# Patient Record
Sex: FEMALE | Race: WHITE | NOT HISPANIC OR LATINO | Employment: FULL TIME | URBAN - METROPOLITAN AREA
[De-identification: names, ages, dates, MRNs, and addresses within clinical notes are randomized per-mention and may not be internally consistent; named-entity substitution may affect disease eponyms.]

---

## 2020-03-16 ENCOUNTER — OFFICE VISIT (OUTPATIENT)
Dept: INTERNAL MEDICINE CLINIC | Age: 30
End: 2020-03-16
Payer: COMMERCIAL

## 2020-03-16 VITALS
OXYGEN SATURATION: 98 % | WEIGHT: 165.6 LBS | TEMPERATURE: 100.1 F | BODY MASS INDEX: 28.27 KG/M2 | HEART RATE: 78 BPM | HEIGHT: 64 IN | SYSTOLIC BLOOD PRESSURE: 124 MMHG | DIASTOLIC BLOOD PRESSURE: 82 MMHG

## 2020-03-16 DIAGNOSIS — J06.9 URTI (ACUTE UPPER RESPIRATORY INFECTION): Primary | ICD-10-CM

## 2020-03-16 DIAGNOSIS — J02.8 PHARYNGITIS DUE TO OTHER ORGANISM: ICD-10-CM

## 2020-03-16 DIAGNOSIS — E66.3 OVERWEIGHT (BMI 25.0-29.9): ICD-10-CM

## 2020-03-16 DIAGNOSIS — J01.80 ACUTE NON-RECURRENT SINUSITIS OF OTHER SINUS: ICD-10-CM

## 2020-03-16 PROCEDURE — 99203 OFFICE O/P NEW LOW 30 MIN: CPT | Performed by: INTERNAL MEDICINE

## 2020-03-16 PROCEDURE — 3008F BODY MASS INDEX DOCD: CPT | Performed by: INTERNAL MEDICINE

## 2020-03-16 PROCEDURE — 1036F TOBACCO NON-USER: CPT | Performed by: INTERNAL MEDICINE

## 2020-03-16 RX ORDER — AMOXICILLIN AND CLAVULANATE POTASSIUM 875; 125 MG/1; MG/1
1 TABLET, FILM COATED ORAL EVERY 12 HOURS SCHEDULED
Qty: 14 TABLET | Refills: 0 | Status: SHIPPED | OUTPATIENT
Start: 2020-03-16 | End: 2020-03-23

## 2020-03-16 RX ORDER — FLUTICASONE PROPIONATE 50 MCG
1 SPRAY, SUSPENSION (ML) NASAL DAILY
Qty: 1 BOTTLE | Refills: 0 | Status: SHIPPED | OUTPATIENT
Start: 2020-03-16 | End: 2020-07-31 | Stop reason: ALTCHOICE

## 2020-03-16 RX ORDER — GUAIFENESIN 600 MG
600 TABLET, EXTENDED RELEASE 12 HR ORAL EVERY 12 HOURS SCHEDULED
Qty: 20 TABLET | Refills: 0 | Status: SHIPPED | OUTPATIENT
Start: 2020-03-16 | End: 2020-07-31 | Stop reason: ALTCHOICE

## 2020-03-16 RX ORDER — NORETHINDRONE ACETATE AND ETHINYL ESTRADIOL 1MG-20(21)
1 KIT ORAL DAILY
COMMUNITY
Start: 2020-02-18 | End: 2021-10-06

## 2020-03-16 NOTE — LETTER
March 16, 2020     Patient: Frankey Laming   YOB: 1990   Date of Visit: 3/16/2020       To Whom it May Concern:    Milton Gupta is under my professional care  She was seen in my office on 3/16/2020  She may return to work on 03/18/2020  If you have any questions or concerns, please don't hesitate to call           Sincerely,          Ivory Smith DO        CC: No Recipients

## 2020-03-16 NOTE — PATIENT INSTRUCTIONS
- Drink plenty of fluids  - Get plenty of rest  - You may use salt water gargles for your sore throat  - You may use over the counter tylenol or Ibuprofen (motrin or Advil) for any headache, muscle aches and fever  -  Please take antibiotics with food and eat yogurt daily as long as you are on antibiotics  - Call the office if your symptoms persist beyond a total of 7-10 days

## 2020-03-16 NOTE — PROGRESS NOTES
Assessment/Plan:    Upper respiratory tract infection with pharyngitis and sinusitis  - likely viral with bacterial superinfection vs bacterial  - we will start pt on Augmentin 875-125 mg twice daily for 7 days, Flonase nasal spray for rhinitis and Mucinex for productive cough  - patient was counseled to take her antibiotics with meals and also to use a probiotic or to eat yogurt daily as long as she is on antibiotics  - Pt was counseled to use OTC tylenol or ibuprofen with meals for aches and pains, warm salt water gargles for sore throat and was encouraged to drink lots of fluids, get plenty of rest and wash her hands liberally  - she should return to the clinic if her symptoms worsen or do not improve  Overweight  - diet and exercise counseling given    BMI Counseling: Body mass index is 28 55 kg/m²  The BMI is above normal  Nutrition recommendations include encouraging healthy choices of fruits and vegetables, consuming healthier snacks, increasing intake of lean protein and reducing intake of saturated and trans fat  Exercise recommendations include moderate physical activity 150 minutes/week  No pharmacotherapy was ordered  Patient referred to PCP due to patient being overweight  Diagnoses and all orders for this visit:    URTI (acute upper respiratory infection)  -     amoxicillin-clavulanate (AUGMENTIN) 875-125 mg per tablet; Take 1 tablet by mouth every 12 (twelve) hours for 7 days  -     fluticasone (FLONASE) 50 mcg/act nasal spray; 1 spray into each nostril daily  -     guaiFENesin (MUCINEX) 600 mg 12 hr tablet; Take 1 tablet (600 mg total) by mouth every 12 (twelve) hours    Pharyngitis due to other organism  -     amoxicillin-clavulanate (AUGMENTIN) 875-125 mg per tablet; Take 1 tablet by mouth every 12 (twelve) hours for 7 days  -     guaiFENesin (MUCINEX) 600 mg 12 hr tablet;  Take 1 tablet (600 mg total) by mouth every 12 (twelve) hours    Acute non-recurrent sinusitis of other sinus  -     amoxicillin-clavulanate (AUGMENTIN) 875-125 mg per tablet; Take 1 tablet by mouth every 12 (twelve) hours for 7 days  -     fluticasone (FLONASE) 50 mcg/act nasal spray; 1 spray into each nostril daily    Overweight (BMI 25 0-29  9)    Other orders  -     BLISOVI FE 1/20 1-20 MG-MCG per tablet          Subjective:      Patient ID: Nazario Gamble is a 35 y o  female  HPI  Patient presents to re-establish care with complaints of cough that is occasionally dry and occasionally productive but she states that she does not know the color of the phlegm because she does not look at it  She also complains of nasal congestion, sinus pain and pressure, rhinorrhea, with yellowish discharge, sore throat, abdominal pain and headaches that started 3 days ago  She denies fever, chills, night sweats, chest pain, shortness of breath, palpitations, nausea, diarrhea, constipation  She took the flu shot this season and denies a history of contact but 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  She denies any history of travel or contact with anyone who traveled outside the country  She does not smoke  The following portions of the patient's history were reviewed and updated as appropriate: She  has no past medical history on file  She   Patient Active Problem List    Diagnosis Date Noted    URTI (acute upper respiratory infection) 03/16/2020    Other acute sinusitis 03/16/2020    Overweight (BMI 25 0-29 9) 03/16/2020     She  has a past surgical history that includes [place] tooth extraction (2013)  Her family history includes Gout in her father  She  reports that she has never smoked  She has never used smokeless tobacco  She reports that she drinks alcohol  She reports that she does not use drugs    Current Outpatient Medications   Medication Sig Dispense Refill    BLISOVI FE 1/20 1-20 MG-MCG per tablet       amoxicillin-clavulanate (AUGMENTIN) 875-125 mg per tablet Take 1 tablet by mouth every 12 (twelve) hours for 7 days 14 tablet 0    fluticasone (FLONASE) 50 mcg/act nasal spray 1 spray into each nostril daily 1 Bottle 0    guaiFENesin (MUCINEX) 600 mg 12 hr tablet Take 1 tablet (600 mg total) by mouth every 12 (twelve) hours 20 tablet 0     No current facility-administered medications for this visit  Current Outpatient Medications on File Prior to Visit   Medication Sig    BLISOVI FE 1/20 1-20 MG-MCG per tablet      No current facility-administered medications on file prior to visit  She has No Known Allergies       Review of Systems   Constitutional: Negative for activity change, chills, fatigue, fever and unexpected weight change  HENT: Positive for congestion, rhinorrhea, sinus pressure, sinus pain and sore throat  Negative for ear pain and postnasal drip  Eyes: Negative for pain  Respiratory: Positive for cough (dry cough, sometimes with phlegm)  Negative for choking, chest tightness, shortness of breath and wheezing  Cardiovascular: Negative for chest pain, palpitations and leg swelling  Gastrointestinal: Positive for abdominal pain  Negative for constipation, diarrhea, nausea and vomiting  Genitourinary: Negative for dysuria and hematuria  Musculoskeletal: Negative for arthralgias, back pain, gait problem, joint swelling, myalgias and neck stiffness  Skin: Negative for pallor and rash  Neurological: Positive for headaches (with a  feeling of pressure in the head)  Negative for dizziness, tremors, seizures, syncope and light-headedness  Hematological: Negative for adenopathy  Psychiatric/Behavioral: Negative for behavioral problems  Objective:      /82 (BP Location: Left arm, Patient Position: Sitting, Cuff Size: Adult)   Pulse 78   Temp 100 1 °F (37 8 °C) (Tympanic)   Ht 5' 3 86" (1 622 m)   Wt 75 1 kg (165 lb 9 6 oz)   SpO2 98%   BMI 28 55 kg/m²          Physical Exam   Constitutional: She is oriented to person, place, and time   She appears well-developed and well-nourished  No distress  HENT:   Head: Normocephalic and atraumatic  Right Ear: Tympanic membrane is injected and erythematous  Left Ear: A foreign body (cerumen in ext aud canal) is present  Tympanic membrane is injected and erythematous  Nose: Mucosal edema and rhinorrhea present  Right sinus exhibits maxillary sinus tenderness and frontal sinus tenderness  Mouth/Throat: Posterior oropharyngeal edema and posterior oropharyngeal erythema present  No oropharyngeal exudate  Eyes: Pupils are equal, round, and reactive to light  Conjunctivae and EOM are normal  Right eye exhibits no discharge  Left eye exhibits no discharge  No scleral icterus  Neck: Normal range of motion  Neck supple  No JVD present  No tracheal deviation present  No thyromegaly present  Cardiovascular: Normal rate, regular rhythm, normal heart sounds and intact distal pulses  Exam reveals no gallop and no friction rub  No murmur heard  Pulmonary/Chest: Effort normal and breath sounds normal  No respiratory distress  She has no wheezes  She has no rales  She exhibits no tenderness  Abdominal: Soft  Bowel sounds are normal  She exhibits no distension and no mass  There is tenderness in the epigastric area  There is no rebound and no guarding  Musculoskeletal: Normal range of motion  She exhibits no edema, tenderness or deformity  Lymphadenopathy:        Head (right side): Submandibular adenopathy present  Head (left side): Submandibular (tender) adenopathy present  She has no cervical adenopathy  Neurological: She is alert and oriented to person, place, and time  She has normal reflexes  No cranial nerve deficit  She exhibits normal muscle tone  Coordination normal    Skin: Skin is warm and dry  No rash noted  She is not diaphoretic  No erythema  No pallor  Psychiatric: She has a normal mood and affect  Her behavior is normal          No visits with results within 12 Month(s) from this visit     Latest known visit with results is:   No results found for any previous visit

## 2020-04-06 DIAGNOSIS — J01.80 ACUTE NON-RECURRENT SINUSITIS OF OTHER SINUS: ICD-10-CM

## 2020-04-06 DIAGNOSIS — J06.9 URTI (ACUTE UPPER RESPIRATORY INFECTION): ICD-10-CM

## 2020-04-06 RX ORDER — FLUTICASONE PROPIONATE 50 MCG
SPRAY, SUSPENSION (ML) NASAL
Qty: 16 ML | Refills: 0 | OUTPATIENT
Start: 2020-04-06

## 2020-07-31 ENCOUNTER — TELEPHONE (OUTPATIENT)
Dept: ADMINISTRATIVE | Facility: OTHER | Age: 30
End: 2020-07-31

## 2020-07-31 ENCOUNTER — OFFICE VISIT (OUTPATIENT)
Dept: INTERNAL MEDICINE CLINIC | Age: 30
End: 2020-07-31
Payer: COMMERCIAL

## 2020-07-31 VITALS
DIASTOLIC BLOOD PRESSURE: 76 MMHG | WEIGHT: 168.4 LBS | HEART RATE: 84 BPM | HEIGHT: 64 IN | SYSTOLIC BLOOD PRESSURE: 120 MMHG | TEMPERATURE: 99.5 F | OXYGEN SATURATION: 98 % | BODY MASS INDEX: 28.75 KG/M2

## 2020-07-31 DIAGNOSIS — E66.3 OVERWEIGHT: ICD-10-CM

## 2020-07-31 DIAGNOSIS — R10.13 DYSPEPSIA: ICD-10-CM

## 2020-07-31 DIAGNOSIS — L72.3 SEBACEOUS CYST: ICD-10-CM

## 2020-07-31 DIAGNOSIS — Z00.00 ANNUAL PHYSICAL EXAM: Primary | ICD-10-CM

## 2020-07-31 DIAGNOSIS — K21.9 GASTROESOPHAGEAL REFLUX DISEASE WITHOUT ESOPHAGITIS: ICD-10-CM

## 2020-07-31 PROCEDURE — 99213 OFFICE O/P EST LOW 20 MIN: CPT | Performed by: INTERNAL MEDICINE

## 2020-07-31 PROCEDURE — 1036F TOBACCO NON-USER: CPT | Performed by: INTERNAL MEDICINE

## 2020-07-31 PROCEDURE — 99395 PREV VISIT EST AGE 18-39: CPT | Performed by: INTERNAL MEDICINE

## 2020-07-31 PROCEDURE — 3008F BODY MASS INDEX DOCD: CPT | Performed by: INTERNAL MEDICINE

## 2020-07-31 RX ORDER — FAMOTIDINE 20 MG/1
20 TABLET, FILM COATED ORAL 2 TIMES DAILY
Qty: 60 TABLET | Refills: 0 | Status: SHIPPED | OUTPATIENT
Start: 2020-07-31 | End: 2021-10-06

## 2020-07-31 NOTE — TELEPHONE ENCOUNTER
Upon review of the In Basket request we were able to locate, review, and update the patient chart as requested for Pap Smear (HPV) aka Cervical Cancer Screening  Any additional questions or concerns should be emailed to the Practice Liaisons via [email or web address] email, please do not reply via In Basket      Thank you  Neeru Cortes MA

## 2020-07-31 NOTE — TELEPHONE ENCOUNTER
----- Message from Herman Cushing, MA sent at 7/31/2020 11:42 AM EDT -----  Regarding: Pap  Contact: [phone]  07/31/20 11:42 AM    Hello, our patient Nicol Dunham has had Pap Smear (HPV) aka Cervical Cancer Screening completed/performed  Please assist in updating the patient chart by pulling the Care Everywhere (CE) document  The date of service is 01/20/2020       Thank you,  Herman Cushing, MA  [facility]

## 2020-07-31 NOTE — PROGRESS NOTES
Assessment/Plan:    Annual physical exam  - History and physical examination done  - Pt was counseled to eat a heart healthy diet, to drink at least 2 L of water daily, to take a daily multivitamin and to exercise for at least 30 minutes of cardio exercise daily, for at least 5 days a week  - CBC, CMP, TSH and lipid panel have been ordered and we will follow up with the results  - she is up-to-date with her tetanus vaccine  -she is up-to-date with her Pap smear  - follow up in 1 month        Diagnoses and all orders for this visit:    Annual physical exam  -     CBC and differential; Future  -     Comprehensive metabolic panel; Future  -     TSH, 3rd generation with Free T4 reflex; Future  -     Lipid panel; Future    Overweight    Dyspepsia  -     H  pylori antigen, stool; Future  -     famotidine (PEPCID) 20 mg tablet; Take 1 tablet (20 mg total) by mouth 2 (two) times a day    Gastroesophageal reflux disease without esophagitis  -     H  pylori antigen, stool; Future  -     famotidine (PEPCID) 20 mg tablet; Take 1 tablet (20 mg total) by mouth 2 (two) times a day          Subjective:      Patient ID: Jodie Muñoz is a 35 y o  female      HPI  Patient presents for an annual physical exam   Last annual physical exam- years ago    Past medical history-  Dyspepsia, seasonal asthma, a lot of bronchitis and pna as a child  Past surgical history- none    Medications- OCP,   Allergies- NKDA    Diet- a mixture of balanced and junk, mostly junk, drinks at least two glasses of water daily  Exercise- not much    Alcohol use- five glasses of wine a week, usually one a day  Caffeine and soda use- about one cup of coffee a day, no soda  Nicotine use- never  Recreational drug use- never     Work- payroll  Sexual history, STD history and HIV testing- monogamous with boyfriend, std hx - never, HIV testing - never    Gynecological history/Prostate health/testicular health history- LMP - two weeks ago, last pap smear - [facility] 2020,  Colonoscopy- N/A  Immunization history- last tetanus shot - 2013    Dental visit- every year  Family history- htn - ? Arlon Doles( dad), ? Dad  Lung ca - grandma( mom) , grandma( dad) - skin    Today, patient admits to abdominal pain in the epigastric region with excessive bloating, excessive gas and heartburn  She states that the symptoms wake her up at night sometimes  She admits that sometimes uses NSAIDs without food  She denies any travel to a 3rd world country  She denies fever, chills, night sweats, chest pain, shortness of breath, palpitations, nausea, vomiting, diarrhea, constipation, dark tarry stools, hematochezia, hematuria, myalgias, arthralgias  She also complains of a hard lump on the lateral aspect of her left lower leg that has been present for about 2 years  She states that it has not changed in size and is not tender or itchy  The following portions of the patient's history were reviewed and updated as appropriate:   She  has no past medical history on file  She   Patient Active Problem List    Diagnosis Date Noted    Annual physical exam 07/31/2020    Dyspepsia 07/31/2020    Gastroesophageal reflux disease without esophagitis 07/31/2020    URTI (acute upper respiratory infection) 03/16/2020    Other acute sinusitis 03/16/2020    Overweight 03/16/2020     She  has a past surgical history that includes [place] tooth extraction (2013)  Her family history includes Gout in her father  She  reports that she has never smoked  She has never used smokeless tobacco  She reports that she drinks alcohol  She reports that she does not use drugs  Current Outpatient Medications   Medication Sig Dispense Refill    BLISOVI  1/20 1-20 MG-MCG per tablet Take 1 tablet by mouth daily       famotidine (PEPCID) 20 mg tablet Take 1 tablet (20 mg total) by mouth 2 (two) times a day 60 tablet 0     No current facility-administered medications for this visit        Current Outpatient Medications on File Prior to Visit   Medication Sig    BLISOVI FE 1/20 1-20 MG-MCG per tablet Take 1 tablet by mouth daily     [DISCONTINUED] fluticasone (FLONASE) 50 mcg/act nasal spray 1 spray into each nostril daily (Patient not taking: Reported on 7/31/2020)    [DISCONTINUED] guaiFENesin (MUCINEX) 600 mg 12 hr tablet Take 1 tablet (600 mg total) by mouth every 12 (twelve) hours (Patient not taking: Reported on 7/31/2020)     No current facility-administered medications on file prior to visit  She has No Known Allergies       Review of Systems   Constitutional: Negative for activity change, chills, fatigue, fever and unexpected weight change  HENT: Negative for ear pain, postnasal drip, rhinorrhea, sinus pressure and sore throat  Eyes: Negative for pain  Respiratory: Negative for cough, choking, chest tightness, shortness of breath and wheezing  Cardiovascular: Negative for chest pain, palpitations and leg swelling  Gastrointestinal: Positive for abdominal pain (epigastic with bloating and excessive gas and heart burn)  Negative for constipation, diarrhea, nausea and vomiting  Genitourinary: Negative for dysuria and hematuria  Musculoskeletal: Negative for arthralgias, back pain, gait problem, joint swelling, myalgias and neck stiffness  Skin: Negative for pallor and rash  A skin bump for 2 years on her left leg, laterally   Neurological: Negative for dizziness, tremors, seizures, syncope, light-headedness and headaches  A sensation of Poor focus   Hematological: Negative for adenopathy  Psychiatric/Behavioral: Negative for behavioral problems  Objective:      /76 (BP Location: Left arm, Patient Position: Sitting, Cuff Size: Standard)   Pulse 84   Temp 99 5 °F (37 5 °C) (Temporal)   Ht 5' 3 86" (1 622 m)   Wt 76 4 kg (168 lb 6 4 oz)   SpO2 98%   BMI 29 03 kg/m²          Physical Exam   Constitutional: She is oriented to person, place, and time   She appears well-developed and well-nourished  No distress  HENT:   Head: Normocephalic and atraumatic  Right Ear: External ear normal    Left Ear: External ear normal    Nose: Nose normal    Mouth/Throat: Oropharynx is clear and moist  No oropharyngeal exudate  Eyes: Pupils are equal, round, and reactive to light  Conjunctivae and EOM are normal  Right eye exhibits no discharge  Left eye exhibits no discharge  No scleral icterus  Neck: Normal range of motion  Neck supple  No JVD present  No tracheal deviation present  No thyromegaly present  Cardiovascular: Normal rate, regular rhythm, normal heart sounds and intact distal pulses  Exam reveals no gallop and no friction rub  No murmur heard  Pulmonary/Chest: Effort normal and breath sounds normal  No respiratory distress  She has no wheezes  She has no rales  She exhibits no tenderness  Abdominal: Soft  Bowel sounds are normal  She exhibits no distension and no mass  There is no tenderness  There is no rebound and no guarding  Musculoskeletal: Normal range of motion  She exhibits no edema, tenderness or deformity  Left lower leg: She exhibits swelling (Sebaceous cyst measuring about 1 cm in lateral aspect of left lower leg)  Legs:  Lymphadenopathy:     She has no cervical adenopathy  Neurological: She is alert and oriented to person, place, and time  She has normal reflexes  No cranial nerve deficit  She exhibits normal muscle tone  Coordination normal    Cranial nerves 2-12 are intact bilaterally  Muscle strength is 5/5 in all extremities  Sensation is intact in bilateral face and extremities  Rapid alternating movement and finger-to-nose pointing test are intact  Deep tendon reflexes are 2+ bilaterally  Gait is intact   Skin: Skin is warm and dry  No rash noted  She is not diaphoretic  No erythema  No pallor  Psychiatric: She has a normal mood and affect   Her behavior is normal          No visits with results within 12 Month(s) from this visit  Latest known visit with results is:   No results found for any previous visit

## 2020-07-31 NOTE — PROGRESS NOTES
Assessment/Plan:      Dyspepsia with GERD  -will order an H pylori antigen in stool  -will start patient on famotidine 20 mg twice daily  -she was counseled to avoid behaviors and diets that trigger some    Sebaceous cyst of left lower leg   -asymptomatic  -will continue to monitor it clinically    Overweight  -diet and exercise counseling given         Diagnoses and all orders for this visit:    Annual physical exam  -     CBC and differential; Future  -     Comprehensive metabolic panel; Future  -     TSH, 3rd generation with Free T4 reflex; Future  -     Lipid panel; Future    Overweight    Dyspepsia  -     H  pylori antigen, stool; Future  -     famotidine (PEPCID) 20 mg tablet; Take 1 tablet (20 mg total) by mouth 2 (two) times a day    Gastroesophageal reflux disease without esophagitis  -     H  pylori antigen, stool; Future  -     famotidine (PEPCID) 20 mg tablet; Take 1 tablet (20 mg total) by mouth 2 (two) times a day    Sebaceous cyst of left lower leg          Subjective:      Patient ID: Gabriel Esparza is a 35 y o  female  HPI  Patient presents for an annual physical and also has complaints  Today, patient admits to abdominal pain in the epigastric region with excessive bloating, excessive gas and heartburn  She states that the symptoms wake her up at night sometimes  She admits that sometimes uses NSAIDs without food  She denies any travel to a 3rd world country  She denies fever, chills, night sweats, chest pain, shortness of breath, palpitations, nausea, vomiting, diarrhea, constipation, dark tarry stools, hematochezia, hematuria, myalgias, arthralgias  She also complains of a hard lump on the lateral aspect of her left lower leg that has been present for about 2 years  She states that it has not changed in size and is not tender or itchy  The following portions of the patient's history were reviewed and updated as appropriate:   She  has no past medical history on file    She Patient Active Problem List    Diagnosis Date Noted    Annual physical exam 07/31/2020    Dyspepsia 07/31/2020    Gastroesophageal reflux disease without esophagitis 07/31/2020    Sebaceous cyst of left lower leg 07/31/2020    URTI (acute upper respiratory infection) 03/16/2020    Other acute sinusitis 03/16/2020    Overweight 03/16/2020     She  has a past surgical history that includes [place] tooth extraction (2013)  Her family history includes Gout in her father  She  reports that she has never smoked  She has never used smokeless tobacco  She reports that she drinks alcohol  She reports that she does not use drugs  Current Outpatient Medications   Medication Sig Dispense Refill    BLISOVI FE 1/20 1-20 MG-MCG per tablet Take 1 tablet by mouth daily       famotidine (PEPCID) 20 mg tablet Take 1 tablet (20 mg total) by mouth 2 (two) times a day 60 tablet 0     No current facility-administered medications for this visit  Current Outpatient Medications on File Prior to Visit   Medication Sig    BLISOVI FE 1/20 1-20 MG-MCG per tablet Take 1 tablet by mouth daily     [DISCONTINUED] fluticasone (FLONASE) 50 mcg/act nasal spray 1 spray into each nostril daily (Patient not taking: Reported on 7/31/2020)    [DISCONTINUED] guaiFENesin (MUCINEX) 600 mg 12 hr tablet Take 1 tablet (600 mg total) by mouth every 12 (twelve) hours (Patient not taking: Reported on 7/31/2020)     No current facility-administered medications on file prior to visit  She has No Known Allergies       Review of Systems   Constitutional: Negative for activity change, chills, fatigue, fever and unexpected weight change  HENT: Negative for ear pain, postnasal drip, rhinorrhea, sinus pressure and sore throat  Eyes: Negative for pain  Respiratory: Negative for cough, choking, chest tightness, shortness of breath and wheezing  Cardiovascular: Negative for chest pain, palpitations and leg swelling     Gastrointestinal: Positive for abdominal distention and abdominal pain (Epigastric abdominal pain with excessive gas and bloating)  Negative for constipation, diarrhea, nausea and vomiting  Genitourinary: Negative for dysuria and hematuria  Musculoskeletal: Negative for arthralgias, back pain, gait problem, joint swelling, myalgias and neck stiffness  Skin: Negative for pallor and rash  Lesion on left lower leg   Neurological: Negative for dizziness, tremors, seizures, syncope, light-headedness and headaches  Hematological: Negative for adenopathy  Psychiatric/Behavioral: Negative for behavioral problems  Objective:      /76 (BP Location: Left arm, Patient Position: Sitting, Cuff Size: Standard)   Pulse 84   Temp 99 5 °F (37 5 °C) (Temporal)   Ht 5' 3 86" (1 622 m)   Wt 76 4 kg (168 lb 6 4 oz)   SpO2 98%   BMI 29 03 kg/m²          Physical Exam   Constitutional: She is oriented to person, place, and time  She appears well-developed and well-nourished  No distress  HENT:   Head: Normocephalic and atraumatic  Right Ear: External ear normal    Left Ear: External ear normal    Nose: Nose normal    Mouth/Throat: Oropharynx is clear and moist  Mucous membranes are dry (Dry mucous membranes)  No oropharyngeal exudate  Eyes: Pupils are equal, round, and reactive to light  Conjunctivae and EOM are normal  Right eye exhibits no discharge  Left eye exhibits no discharge  No scleral icterus  Neck: Normal range of motion  Neck supple  No JVD present  No tracheal deviation present  No thyromegaly present  Cardiovascular: Normal rate, regular rhythm, normal heart sounds and intact distal pulses  Exam reveals no gallop and no friction rub  No murmur heard  Pulmonary/Chest: Effort normal and breath sounds normal  No respiratory distress  She has no wheezes  She has no rales  She exhibits no tenderness  Abdominal: Soft  Bowel sounds are normal  She exhibits no distension and no mass   There is tenderness ( epigastric abdominal tenderness) in the epigastric area  There is no rebound and no guarding  Musculoskeletal: Normal range of motion  She exhibits no edema, tenderness or deformity  Left lower leg: She exhibits swelling (Hard lump that measures about 1 cm on the lateral aspect of the left lower leg)  Legs:  Lymphadenopathy:     She has no cervical adenopathy  Neurological: She is alert and oriented to person, place, and time  She has normal reflexes  No cranial nerve deficit  She exhibits normal muscle tone  Coordination normal    Skin: Skin is warm and dry  No rash noted  She is not diaphoretic  No erythema  No pallor  Psychiatric: She has a normal mood and affect  Her behavior is normal          No visits with results within 12 Month(s) from this visit  Latest known visit with results is:   No results found for any previous visit

## 2020-07-31 NOTE — PATIENT INSTRUCTIONS

## 2020-08-08 LAB
ALBUMIN SERPL-MCNC: 4.2 G/DL (ref 3.6–5.1)
ALBUMIN/GLOB SERPL: 1.8 (CALC) (ref 1–2.5)
ALP SERPL-CCNC: 56 U/L (ref 31–125)
ALT SERPL-CCNC: 23 U/L (ref 6–29)
AST SERPL-CCNC: 18 U/L (ref 10–30)
BASOPHILS # BLD AUTO: 52 CELLS/UL (ref 0–200)
BASOPHILS NFR BLD AUTO: 0.7 %
BILIRUB SERPL-MCNC: 0.3 MG/DL (ref 0.2–1.2)
BUN SERPL-MCNC: 12 MG/DL (ref 7–25)
BUN/CREAT SERPL: NORMAL (CALC) (ref 6–22)
CALCIUM SERPL-MCNC: 9 MG/DL (ref 8.6–10.2)
CHLORIDE SERPL-SCNC: 108 MMOL/L (ref 98–110)
CHOLEST SERPL-MCNC: 148 MG/DL
CHOLEST/HDLC SERPL: 2.9 (CALC)
CO2 SERPL-SCNC: 24 MMOL/L (ref 20–32)
CREAT SERPL-MCNC: 0.88 MG/DL (ref 0.5–1.1)
EOSINOPHIL # BLD AUTO: 111 CELLS/UL (ref 15–500)
EOSINOPHIL NFR BLD AUTO: 1.5 %
ERYTHROCYTE [DISTWIDTH] IN BLOOD BY AUTOMATED COUNT: 12 % (ref 11–15)
GLOBULIN SER CALC-MCNC: 2.4 G/DL (CALC) (ref 1.9–3.7)
GLUCOSE SERPL-MCNC: 83 MG/DL (ref 65–99)
HCT VFR BLD AUTO: 42.3 % (ref 35–45)
HDLC SERPL-MCNC: 51 MG/DL
HGB BLD-MCNC: 14 G/DL (ref 11.7–15.5)
LDLC SERPL CALC-MCNC: 79 MG/DL (CALC)
LYMPHOCYTES # BLD AUTO: 3145 CELLS/UL (ref 850–3900)
LYMPHOCYTES NFR BLD AUTO: 42.5 %
MCH RBC QN AUTO: 28.4 PG (ref 27–33)
MCHC RBC AUTO-ENTMCNC: 33.1 G/DL (ref 32–36)
MCV RBC AUTO: 85.8 FL (ref 80–100)
MONOCYTES # BLD AUTO: 518 CELLS/UL (ref 200–950)
MONOCYTES NFR BLD AUTO: 7 %
NEUTROPHILS # BLD AUTO: 3574 CELLS/UL (ref 1500–7800)
NEUTROPHILS NFR BLD AUTO: 48.3 %
NONHDLC SERPL-MCNC: 97 MG/DL (CALC)
PLATELET # BLD AUTO: 286 THOUSAND/UL (ref 140–400)
PMV BLD REES-ECKER: 9.9 FL (ref 7.5–12.5)
POTASSIUM SERPL-SCNC: 4 MMOL/L (ref 3.5–5.3)
PROT SERPL-MCNC: 6.6 G/DL (ref 6.1–8.1)
RBC # BLD AUTO: 4.93 MILLION/UL (ref 3.8–5.1)
SL AMB EGFR AFRICAN AMERICAN: 103 ML/MIN/1.73M2
SL AMB EGFR NON AFRICAN AMERICAN: 89 ML/MIN/1.73M2
SODIUM SERPL-SCNC: 141 MMOL/L (ref 135–146)
TRIGL SERPL-MCNC: 94 MG/DL
TSH SERPL-ACNC: 4.07 MIU/L
WBC # BLD AUTO: 7.4 THOUSAND/UL (ref 3.8–10.8)

## 2020-08-10 ENCOUNTER — TELEPHONE (OUTPATIENT)
Dept: OTHER | Facility: HOSPITAL | Age: 30
End: 2020-08-10

## 2020-08-25 ENCOUNTER — TELEPHONE (OUTPATIENT)
Dept: INTERNAL MEDICINE CLINIC | Age: 30
End: 2020-08-25

## 2020-09-09 ENCOUNTER — APPOINTMENT (OUTPATIENT)
Dept: LAB | Age: 30
End: 2020-09-09
Payer: COMMERCIAL

## 2020-09-09 ENCOUNTER — OFFICE VISIT (OUTPATIENT)
Dept: INTERNAL MEDICINE CLINIC | Age: 30
End: 2020-09-09
Payer: COMMERCIAL

## 2020-09-09 VITALS
HEIGHT: 64 IN | TEMPERATURE: 98.7 F | BODY MASS INDEX: 28.44 KG/M2 | SYSTOLIC BLOOD PRESSURE: 118 MMHG | DIASTOLIC BLOOD PRESSURE: 72 MMHG | HEART RATE: 100 BPM | WEIGHT: 166.6 LBS | OXYGEN SATURATION: 99 %

## 2020-09-09 DIAGNOSIS — R10.33 PERIUMBILICAL ABDOMINAL PAIN: Primary | ICD-10-CM

## 2020-09-09 DIAGNOSIS — R10.13 EPIGASTRIC ABDOMINAL PAIN: ICD-10-CM

## 2020-09-09 DIAGNOSIS — K21.9 GASTROESOPHAGEAL REFLUX DISEASE WITHOUT ESOPHAGITIS: ICD-10-CM

## 2020-09-09 DIAGNOSIS — Z00.00 ANNUAL PHYSICAL EXAM: ICD-10-CM

## 2020-09-09 DIAGNOSIS — R10.33 PERIUMBILICAL ABDOMINAL PAIN: ICD-10-CM

## 2020-09-09 DIAGNOSIS — K59.09 OTHER CONSTIPATION: ICD-10-CM

## 2020-09-09 LAB
ALBUMIN SERPL BCP-MCNC: 4.2 G/DL (ref 3.5–5)
ALP SERPL-CCNC: 67 U/L (ref 46–116)
ALT SERPL W P-5'-P-CCNC: 53 U/L (ref 12–78)
ANION GAP SERPL CALCULATED.3IONS-SCNC: 6 MMOL/L (ref 4–13)
AST SERPL W P-5'-P-CCNC: 29 U/L (ref 5–45)
BASOPHILS # BLD AUTO: 0.05 THOUSANDS/ΜL (ref 0–0.1)
BASOPHILS NFR BLD AUTO: 1 % (ref 0–1)
BILIRUB SERPL-MCNC: 0.22 MG/DL (ref 0.2–1)
BUN SERPL-MCNC: 14 MG/DL (ref 5–25)
CALCIUM SERPL-MCNC: 8.9 MG/DL (ref 8.3–10.1)
CHLORIDE SERPL-SCNC: 108 MMOL/L (ref 100–108)
CHOLEST SERPL-MCNC: 177 MG/DL (ref 50–200)
CO2 SERPL-SCNC: 26 MMOL/L (ref 21–32)
CREAT SERPL-MCNC: 0.87 MG/DL (ref 0.6–1.3)
EOSINOPHIL # BLD AUTO: 0.05 THOUSAND/ΜL (ref 0–0.61)
EOSINOPHIL NFR BLD AUTO: 1 % (ref 0–6)
ERYTHROCYTE [DISTWIDTH] IN BLOOD BY AUTOMATED COUNT: 11.7 % (ref 11.6–15.1)
GFR SERPL CREATININE-BSD FRML MDRD: 90 ML/MIN/1.73SQ M
GLUCOSE P FAST SERPL-MCNC: 79 MG/DL (ref 65–99)
HCT VFR BLD AUTO: 44.3 % (ref 34.8–46.1)
HDLC SERPL-MCNC: 51 MG/DL
HGB BLD-MCNC: 14.6 G/DL (ref 11.5–15.4)
IMM GRANULOCYTES # BLD AUTO: 0.02 THOUSAND/UL (ref 0–0.2)
IMM GRANULOCYTES NFR BLD AUTO: 0 % (ref 0–2)
LDLC SERPL CALC-MCNC: 108 MG/DL (ref 0–100)
LIPASE SERPL-CCNC: 89 U/L (ref 73–393)
LYMPHOCYTES # BLD AUTO: 2.7 THOUSANDS/ΜL (ref 0.6–4.47)
LYMPHOCYTES NFR BLD AUTO: 35 % (ref 14–44)
MCH RBC QN AUTO: 28.2 PG (ref 26.8–34.3)
MCHC RBC AUTO-ENTMCNC: 33 G/DL (ref 31.4–37.4)
MCV RBC AUTO: 86 FL (ref 82–98)
MONOCYTES # BLD AUTO: 0.55 THOUSAND/ΜL (ref 0.17–1.22)
MONOCYTES NFR BLD AUTO: 7 % (ref 4–12)
NEUTROPHILS # BLD AUTO: 4.33 THOUSANDS/ΜL (ref 1.85–7.62)
NEUTS SEG NFR BLD AUTO: 56 % (ref 43–75)
NONHDLC SERPL-MCNC: 126 MG/DL
NRBC BLD AUTO-RTO: 0 /100 WBCS
PLATELET # BLD AUTO: 330 THOUSANDS/UL (ref 149–390)
PMV BLD AUTO: 9.7 FL (ref 8.9–12.7)
POTASSIUM SERPL-SCNC: 4 MMOL/L (ref 3.5–5.3)
PROT SERPL-MCNC: 7.9 G/DL (ref 6.4–8.2)
RBC # BLD AUTO: 5.18 MILLION/UL (ref 3.81–5.12)
SODIUM SERPL-SCNC: 140 MMOL/L (ref 136–145)
TRIGL SERPL-MCNC: 91 MG/DL
TSH SERPL DL<=0.05 MIU/L-ACNC: 1.95 UIU/ML (ref 0.36–3.74)
WBC # BLD AUTO: 7.7 THOUSAND/UL (ref 4.31–10.16)

## 2020-09-09 PROCEDURE — 84443 ASSAY THYROID STIM HORMONE: CPT

## 2020-09-09 PROCEDURE — 85025 COMPLETE CBC W/AUTO DIFF WBC: CPT

## 2020-09-09 PROCEDURE — 99214 OFFICE O/P EST MOD 30 MIN: CPT | Performed by: INTERNAL MEDICINE

## 2020-09-09 PROCEDURE — 80053 COMPREHEN METABOLIC PANEL: CPT

## 2020-09-09 PROCEDURE — 1036F TOBACCO NON-USER: CPT | Performed by: INTERNAL MEDICINE

## 2020-09-09 PROCEDURE — 80061 LIPID PANEL: CPT

## 2020-09-09 PROCEDURE — 36415 COLL VENOUS BLD VENIPUNCTURE: CPT

## 2020-09-09 PROCEDURE — 83690 ASSAY OF LIPASE: CPT

## 2020-09-09 NOTE — PROGRESS NOTES
Assessment/Plan:    Periumbilical abdominal pain  -possibly secondary to irritable bowel syndrome versus pancreatitis versus appendicitis versus constipation or other process  - will order a CT scan of the abdomen and pelvis to rule out an intra-abdominal process such as pancreatitis, the beginning of appendicitis or other pathology  -will order a lipase level, CBC and BMP  -follow-up in 2 weeks    Constipation  -patient was counseled to increase her water intake to at least 2 L a day  -she was counseled to increase her fiber intake as well    Epigastric abdominal pain  -possibly secondary to gastritis versus GERD  -continue with famotidine    GERD  -continue with famotidine  -try to avoid behaviors and diets that trigger symptoms  Diagnoses and all orders for this visit:    Periumbilical abdominal pain  -     CT abdomen pelvis w contrast; Future  -     Basic metabolic panel; Future  -     Lipase; Future  -     CBC and differential; Future    Gastroesophageal reflux disease without esophagitis  -     Basic metabolic panel; Future  -     Lipase; Future    Epigastric abdominal pain  -     CT abdomen pelvis w contrast; Future  -     Basic metabolic panel; Future  -     Lipase; Future  -     CBC and differential; Future    Other constipation          Subjective:      Patient ID: Karly Purcell is a 35 y o  female  HPI  Patient presents with complaints of persistent abdominal pain in her periumbilical region for the past 6 days  She states that she has been taking  Pepcid regularly but it has not helped her periumbilical pain  She describes the pain as dull and grades it as 3 to 4/10  Pain is nonradiating and there is associated bloated sensation with constipation  She states that she normally has a bowel movement twice a day but now she has been going once a day but she does admit that passing gas or having a bowel movement improves the pain    She denies nausea, vomiting, diarrhea, black tarry stools, hematochezia, fever, chills, night sweats, chest pain, shortness of breath, palpitations  She states that the pain wakes her up sometimes at night and she has not been able to associate  any particular diet with worsening pain or improving pain  She does not know if pain is worsened by alcohol but states that she initially thought so but then she stopped drinking and the pain continued  She denies taking any NSAIDs and states that she did not take the famotidine today  She states that she has been urinating more often during the day but denies nocturia, dysuria, hematuria, feeling of incomplete emptying, flank pain, suprapubic pain  Of note, patient is currently on her menstrual period and states that today is likely the last day of her menstrual peroid  She states that she drinks only about 3 cups of water daily and does not eat much fiber  The following portions of the patient's history were reviewed and updated as appropriate:   She  has no past medical history on file  She   Patient Active Problem List    Diagnosis Date Noted    Periumbilical abdominal pain 09/09/2020    Other constipation 09/09/2020    Annual physical exam 07/31/2020    Dyspepsia 07/31/2020    Gastroesophageal reflux disease without esophagitis 07/31/2020    Sebaceous cyst of left lower leg 07/31/2020    URTI (acute upper respiratory infection) 03/16/2020    Other acute sinusitis 03/16/2020    Overweight 03/16/2020     She  has a past surgical history that includes [place] tooth extraction (2013)  Her family history includes Gout in her father  She  reports that she has never smoked  She has never used smokeless tobacco  She reports current alcohol use  She reports that she does not use drugs    Current Outpatient Medications   Medication Sig Dispense Refill    BLISOVI  1/20 1-20 MG-MCG per tablet Take 1 tablet by mouth daily       famotidine (PEPCID) 20 mg tablet Take 1 tablet (20 mg total) by mouth 2 (two) times a day 60 tablet 0     No current facility-administered medications for this visit  Current Outpatient Medications on File Prior to Visit   Medication Sig    BLISOVI FE 1/20 1-20 MG-MCG per tablet Take 1 tablet by mouth daily     famotidine (PEPCID) 20 mg tablet Take 1 tablet (20 mg total) by mouth 2 (two) times a day     No current facility-administered medications on file prior to visit  She has No Known Allergies       Review of Systems   Constitutional: Negative for activity change, chills, fatigue, fever and unexpected weight change  HENT: Negative for ear pain, postnasal drip, rhinorrhea, sinus pressure and sore throat  Eyes: Negative for pain  Respiratory: Negative for cough, choking, chest tightness, shortness of breath and wheezing  Cardiovascular: Negative for chest pain, palpitations and leg swelling  Gastrointestinal: Positive for abdominal distention (bloated sensation, passing gas makes the pain feel better and having a bowel movement makes the pain feel better), abdominal pain (dull and constant for the past 6 days in her, by the side of her umbilicus and grade 2-9/00) and constipation ( used to go twice a day and now goes once a day)  Negative for blood in stool, diarrhea, nausea and vomiting  Genitourinary: Negative for dysuria and hematuria  Musculoskeletal: Negative for arthralgias, back pain, gait problem, joint swelling, myalgias and neck stiffness  Skin: Negative for pallor and rash  Neurological: Negative for dizziness, tremors, seizures, syncope, light-headedness and headaches  Hematological: Negative for adenopathy  Psychiatric/Behavioral: Negative for behavioral problems  The patient is nervous/anxious            Objective:      /72 (BP Location: Left arm, Patient Position: Sitting, Cuff Size: Standard)   Pulse 100   Temp 98 7 °F (37 1 °C) (Temporal)   Ht 5' 3 86" (1 622 m)   Wt 75 6 kg (166 lb 9 6 oz)   SpO2 99%   BMI 28 72 kg/m²          Physical Exam  Constitutional:       General: She is not in acute distress  Appearance: She is well-developed  She is not diaphoretic  HENT:      Head: Normocephalic and atraumatic  Right Ear: External ear normal       Left Ear: External ear normal       Nose: Nose normal       Mouth/Throat:      Mouth: Mucous membranes are dry  Pharynx: No oropharyngeal exudate  Comments: Dry mucous membranes  Eyes:      General: No scleral icterus  Right eye: No discharge  Left eye: No discharge  Conjunctiva/sclera: Conjunctivae normal       Pupils: Pupils are equal, round, and reactive to light  Neck:      Musculoskeletal: Normal range of motion and neck supple  Thyroid: No thyromegaly  Vascular: No JVD  Trachea: No tracheal deviation  Cardiovascular:      Rate and Rhythm: Normal rate and regular rhythm  Heart sounds: Normal heart sounds  No murmur  No friction rub  No gallop  Pulmonary:      Effort: Pulmonary effort is normal  No respiratory distress  Breath sounds: Normal breath sounds  No wheezing or rales  Chest:      Chest wall: No tenderness  Abdominal:      General: Bowel sounds are normal  There is no distension  Palpations: Abdomen is soft  There is no mass  Tenderness: There is abdominal tenderness (Epigastric abdominal tenderness and periumbilical abdominal tenderness) in the epigastric area and periumbilical area  There is no guarding or rebound  Musculoskeletal: Normal range of motion  General: No tenderness or deformity  Lymphadenopathy:      Cervical: No cervical adenopathy  Skin:     General: Skin is warm and dry  Coloration: Skin is not pale  Findings: No erythema or rash  Neurological:      Mental Status: She is alert and oriented to person, place, and time  Cranial Nerves: No cranial nerve deficit  Motor: No abnormal muscle tone        Coordination: Coordination normal       Deep Tendon Reflexes: Reflexes are normal and symmetric  Psychiatric:         Behavior: Behavior normal            Orders Only on 08/07/2020   Component Date Value Ref Range Status    H  Pylori Ag, EIA, Stool 08/07/2020    Final    Comment:   HELICOBACTER PYLORI AG, EIA, STOOL         Micro Number:      [id number]    Test Status:       Final    Specimen Source:   STOOL    Specimen Quality:  Adequate    H pylori Ag:       Not Detected                                               Antimicrobials, proton pump inhibitors, and                       bismuth preparations inhibit H  pylori and                       ingestion up to two weeks prior to testing may                       cause false negative results  If clinically                       indicated the test should be repeated on a new                       specimen obtained two weeks after discontinuing                       treatment  Orders Only on 08/07/2020   Component Date Value Ref Range Status    Total Cholesterol 08/07/2020 148  <200 mg/dL Final    HDL 08/07/2020 51  > OR = 50 mg/dL Final    Triglycerides 08/07/2020 94  <150 mg/dL Final    LDL Calculated 08/07/2020 79  mg/dL (calc) Final    Comment: Reference range: <100     Desirable range <100 mg/dL for primary prevention;    <70 mg/dL for patients with CHD or diabetic patients   with > or = 2 CHD risk factors  LDL-C is now calculated using the Toni-Naranjo   calculation, which is a validated novel method providing   better accuracy than the Friedewald equation in the   estimation of LDL-C  [place]  [place] [id number];420(43): 9172-6317   (http://TM/faq/SVR447)      Chol HDLC Ratio 08/07/2020 2 9  <5 0 (calc) Final    Non-HDL Cholesterol 08/07/2020 97  <130 mg/dL (calc) Final    Comment: For patients with diabetes plus 1 major ASCVD risk   factor, treating to a non-HDL-C goal of <100 mg/dL   (LDL-C of <70 mg/dL) is considered a therapeutic   option        Glucose, Random 08/07/2020 83  65 - 99 mg/dL Final    Comment:               Fasting reference interval         BUN 08/07/2020 12  7 - 25 mg/dL Final    Creatinine 08/07/2020 0 88  0 50 - 1 10 mg/dL Final    eGFR Non  08/07/2020 89  > OR = 60 mL/min/1 73m2 Final    eGFR  08/07/2020 103  > OR = 60 mL/min/1 73m2 Final    SL AMB BUN/CREATININE RATIO 77/19/2762 NOT APPLICABLE  6 - 22 (calc) Final    Sodium 08/07/2020 141  135 - 146 mmol/L Final    Potassium 08/07/2020 4 0  3 5 - 5 3 mmol/L Final    Chloride 08/07/2020 108  98 - 110 mmol/L Final    CO2 08/07/2020 24  20 - 32 mmol/L Final    Calcium 08/07/2020 9 0  8 6 - 10 2 mg/dL Final    Protein, Total 08/07/2020 6 6  6 1 - 8 1 g/dL Final    Albumin 08/07/2020 4 2  3 6 - 5 1 g/dL Final    Globulin 08/07/2020 2 4  1 9 - 3 7 g/dL (calc) Final    Albumin/Globulin Ratio 08/07/2020 1 8  1 0 - 2 5 (calc) Final    TOTAL BILIRUBIN 08/07/2020 0 3  0 2 - 1 2 mg/dL Final    Alkaline Phosphatase 08/07/2020 56  31 - 125 U/L Final    AST 08/07/2020 18  10 - 30 U/L Final    ALT 08/07/2020 23  6 - 29 U/L Final    White Blood Cell Count 08/07/2020 7 4  3 8 - 10 8 Thousand/uL Final    Red Blood Cell Count 08/07/2020 4 93  3 80 - 5 10 Million/uL Final    Hemoglobin 08/07/2020 14 0  11 7 - 15 5 g/dL Final    HCT 08/07/2020 42 3  35 0 - 45 0 % Final    MCV 08/07/2020 85 8  80 0 - 100 0 fL Final    MCH 08/07/2020 28 4  27 0 - 33 0 pg Final    MCHC 08/07/2020 33 1  32 0 - 36 0 g/dL Final    RDW 08/07/2020 12 0  11 0 - 15 0 % Final    Platelet Count 88/11/6915 286  140 - 400 Thousand/uL Final    SL AMB MPV 08/07/2020 9 9  7 5 - 12 5 fL Final    Neutrophils (Absolute) 08/07/2020 3,574  1,500 - 7,800 cells/uL Final    Lymphocytes (Absolute) 08/07/2020 3,145  850 - 3,900 cells/uL Final    Monocytes (Absolute) 08/07/2020 518  200 - 950 cells/uL Final    Eosinophils (Absolute) 08/07/2020 111  15 - 500 cells/uL Final    Basophils ABS 08/07/2020 52  0 - 200 cells/uL Final    Neutrophils 08/07/2020 48 3  % Final    Lymphocytes 08/07/2020 42 5  % Final    Monocytes 08/07/2020 7 0  % Final    Eosinophils 08/07/2020 1 5  % Final    Basophils PCT 08/07/2020 0 7  % Final    TSH W/RFX TO FREE T4 08/07/2020 4 07  mIU/L Final    Comment:           Reference Range                         > or = 20 Years  0 40-4 50                              Pregnancy Ranges            First trimester    0 26-2 66            Second trimester   0 55-2 73            Third trimester    0 43-2 91

## 2020-09-11 ENCOUNTER — HOSPITAL ENCOUNTER (OUTPATIENT)
Dept: RADIOLOGY | Facility: HOSPITAL | Age: 30
Discharge: HOME | End: 2020-09-11
Payer: COMMERCIAL

## 2020-09-11 DIAGNOSIS — R10.13 EPIGASTRIC ABDOMINAL PAIN: ICD-10-CM

## 2020-09-11 DIAGNOSIS — R10.33 PERIUMBILICAL ABDOMINAL PAIN: ICD-10-CM

## 2020-09-11 PROCEDURE — 74177 CT ABD & PELVIS W/CONTRAST: CPT

## 2020-09-11 PROCEDURE — G1004 CDSM NDSC: HCPCS

## 2020-09-11 RX ADMIN — IOHEXOL 85 ML: 350 INJECTION, SOLUTION INTRAVENOUS at 16:13

## 2020-09-17 ENCOUNTER — TELEPHONE (OUTPATIENT)
Dept: OTHER | Facility: HOSPITAL | Age: 30
End: 2020-09-17

## 2020-09-17 ENCOUNTER — TELEPHONE (OUTPATIENT)
Dept: INTERNAL MEDICINE CLINIC | Facility: CLINIC | Age: 30
End: 2020-09-17

## 2020-09-17 DIAGNOSIS — R10.33 PERIUMBILICAL ABDOMINAL PAIN: Primary | ICD-10-CM

## 2020-09-17 DIAGNOSIS — K59.09 OTHER CONSTIPATION: ICD-10-CM

## 2020-09-17 DIAGNOSIS — K21.9 GASTROESOPHAGEAL REFLUX DISEASE WITHOUT ESOPHAGITIS: ICD-10-CM

## 2020-09-17 NOTE — TELEPHONE ENCOUNTER
I called and discussed pt's normal CT abdomen and pelvis result with her  She states that her abdominal pain is currently a 1/10, worse at night and located right above her umbilicus and she is mildly constipated and her symptoms get mildly improved when she has a bowel movement  She states that she is still taking her famotidine without improvement  I will refer her to GI and ask the office staff to mail the referral to her

## 2020-09-18 ENCOUNTER — TELEPHONE (OUTPATIENT)
Dept: INTERNAL MEDICINE CLINIC | Age: 30
End: 2020-09-18

## 2020-09-18 NOTE — TELEPHONE ENCOUNTER
----- Message from Yadiel Bach, DO sent at 9/17/2020  6:35 PM EDT -----  Aline Hess,  Please can you mail the referral to GI to Ms Theodora Whaley? Thanks dear!

## 2020-12-03 LAB — EXT SARS-COV-2: POSITIVE

## 2020-12-08 ENCOUNTER — TELEPHONE (OUTPATIENT)
Dept: INTERNAL MEDICINE CLINIC | Age: 30
End: 2020-12-08

## 2020-12-10 ENCOUNTER — TELEPHONE (OUTPATIENT)
Dept: OTHER | Facility: HOSPITAL | Age: 30
End: 2020-12-10

## 2020-12-10 ENCOUNTER — TELEPHONE (OUTPATIENT)
Dept: INTERNAL MEDICINE CLINIC | Age: 30
End: 2020-12-10

## 2021-03-09 ENCOUNTER — TELEMEDICINE (OUTPATIENT)
Dept: INTERNAL MEDICINE CLINIC | Facility: CLINIC | Age: 31
End: 2021-03-09
Payer: COMMERCIAL

## 2021-03-09 ENCOUNTER — TELEPHONE (OUTPATIENT)
Dept: INTERNAL MEDICINE CLINIC | Facility: CLINIC | Age: 31
End: 2021-03-09

## 2021-03-09 VITALS — BODY MASS INDEX: 27.14 KG/M2 | TEMPERATURE: 97 F | HEIGHT: 64 IN | WEIGHT: 159 LBS

## 2021-03-09 DIAGNOSIS — J02.9 PHARYNGITIS, UNSPECIFIED ETIOLOGY: Primary | ICD-10-CM

## 2021-03-09 DIAGNOSIS — B34.9 VIRAL INFECTION, UNSPECIFIED: ICD-10-CM

## 2021-03-09 PROCEDURE — 99213 OFFICE O/P EST LOW 20 MIN: CPT | Performed by: NURSE PRACTITIONER

## 2021-03-09 PROCEDURE — 3725F SCREEN DEPRESSION PERFORMED: CPT | Performed by: NURSE PRACTITIONER

## 2021-03-09 RX ORDER — CEPHALEXIN 500 MG/1
500 CAPSULE ORAL EVERY 12 HOURS SCHEDULED
Qty: 14 CAPSULE | Refills: 0 | Status: SHIPPED | OUTPATIENT
Start: 2021-03-09 | End: 2021-03-16

## 2021-03-09 RX ORDER — NORETHINDRONE ACETATE AND ETHINYL ESTRADIOL 1MG-20(21)
1 KIT ORAL DAILY
COMMUNITY

## 2021-03-09 NOTE — PROGRESS NOTES
Virtual Regular Visit      Assessment/Plan:    Problem List Items Addressed This Visit     None      Visit Diagnoses     Pharyngitis, unspecified etiology    -  Primary    recently on amoxicillin in Dec  warm tea w  honey  advil and tylenol prn    Relevant Medications    cephalexin (KEFLEX) 500 mg capsule    Viral infection, unspecified        > 90 days since last covid infection  isolate until results are back    Relevant Orders    Novel Coronavirus (Covid-19),[facility]            Reason for visit is   Chief Complaint   Patient presents with    Sore Throat    Virtual Regular Visit        Encounter provider MARCELLO Martinez    Provider located at [address] [place] [address] [address] [facility] [id number]      Recent Visits  No visits were found meeting these conditions  Showing recent visits within past 7 days and meeting all other requirements     Today's Visits  Date Type Provider Dept   03/09/21 Telemedicine MARCELLO Martinez [facility]   Showing today's visits and meeting all other requirements     Future Appointments  No visits were found meeting these conditions  Showing future appointments within next 150 days and meeting all other requirements        The patient was identified by name and date of birth  Kiki Bello was informed that this is a telemedicine visit and that the visit is being conducted through [facility] and patient was informed that this is not a secure, HIPAA-compliant platform  She agrees to proceed     My office door was closed  No one else was in the room  She acknowledged consent and understanding of privacy and security of the video platform  The patient has agreed to participate and understands they can discontinue the visit at any time  Patient is aware this is a billable service       Subjective  Yannick Bai Renate Talavera is a 28 y o  female    Sore Throat   This is a new problem  The current episode started yesterday  The problem has been gradually worsening  The pain is worse on the right side  There has been no fever  The pain is at a severity of 5/10  Associated symptoms include ear pain (mild right sided) and swollen glands  Pertinent negatives include no congestion, headaches, shortness of breath or stridor  Associated symptoms comments: fatigue  She has had no exposure to strep or mono  Treatments tried: dayquil  The treatment provided no relief  No known exposure to covid  She did test positive for covid 12/3      No past medical history on file  Past Surgical History:   Procedure Laterality Date    WISDOM TOOTH EXTRACTION  2013       Current Outpatient Medications   Medication Sig Dispense Refill    norethindrone-ethinyl estradiol (JUNEL FE 1/20) 1-20 MG-MCG per tablet Take 1 tablet by mouth daily      BLISOVI FE 1/20 1-20 MG-MCG per tablet Take 1 tablet by mouth daily       cephalexin (KEFLEX) 500 mg capsule Take 1 capsule (500 mg total) by mouth every 12 (twelve) hours for 7 days 14 capsule 0    famotidine (PEPCID) 20 mg tablet Take 1 tablet (20 mg total) by mouth 2 (two) times a day (Patient not taking: Reported on 3/9/2021) 60 tablet 0     No current facility-administered medications for this visit  No Known Allergies    Review of Systems   HENT: Positive for ear pain (mild right sided) and sore throat  Negative for congestion  Respiratory: Negative for shortness of breath and stridor  Neurological: Negative for headaches  Video Exam    Vitals:    03/09/21 1640   Temp: (!) 97 °F (36 1 °C)   Weight: 72 1 kg (159 lb)   Height: 5' 3 86" (1 622 m)       Physical Exam  Vitals signs reviewed  Constitutional:       General: She is not in acute distress  Appearance: She is well-developed  She is not diaphoretic  HENT:      Head: Normocephalic and atraumatic        Mouth/Throat: Mouth: Mucous membranes are moist       Pharynx: Oropharynx is clear  Tonsils: No tonsillar exudate  2+ on the right  1+ on the left  Pulmonary:      Effort: Pulmonary effort is normal  No respiratory distress  Neurological:      Mental Status: She is alert and oriented to person, place, and time  Mental status is at baseline  Psychiatric:         Mood and Affect: Mood normal          Behavior: Behavior normal           I spent 10 minutes directly with the patient during this visit      VIRTUAL VISIT DISCLAIMER    Christi Ruiz acknowledges that she has consented to an online visit or consultation  She understands that the online visit is based solely on information provided by her, and that, in the absence of a face-to-face physical evaluation by the physician, the diagnosis she receives is both limited and provisional in terms of accuracy and completeness  This is not intended to replace a full medical face-to-face evaluation by the physician  Christi Ruiz understands and accepts these terms

## 2021-03-10 DIAGNOSIS — B34.9 VIRAL INFECTION, UNSPECIFIED: ICD-10-CM

## 2021-03-10 LAB — SARS-COV-2 RNA RESP QL NAA+PROBE: NEGATIVE

## 2021-03-10 PROCEDURE — U0003 INFECTIOUS AGENT DETECTION BY NUCLEIC ACID (DNA OR RNA); SEVERE ACUTE RESPIRATORY SYNDROME CORONAVIRUS 2 (SARS-COV-2) (CORONAVIRUS DISEASE [COVID-19]), AMPLIFIED PROBE TECHNIQUE, MAKING USE OF HIGH THROUGHPUT TECHNOLOGIES AS DESCRIBED BY CMS-2020-01-R: HCPCS | Performed by: NURSE PRACTITIONER

## 2021-03-10 PROCEDURE — U0005 INFEC AGEN DETEC AMPLI PROBE: HCPCS | Performed by: NURSE PRACTITIONER

## 2021-03-29 ENCOUNTER — OFFICE VISIT (OUTPATIENT)
Dept: INTERNAL MEDICINE CLINIC | Age: 31
End: 2021-03-29
Payer: COMMERCIAL

## 2021-03-29 ENCOUNTER — TELEPHONE (OUTPATIENT)
Dept: INTERNAL MEDICINE CLINIC | Age: 31
End: 2021-03-29

## 2021-03-29 VITALS
DIASTOLIC BLOOD PRESSURE: 88 MMHG | TEMPERATURE: 99 F | OXYGEN SATURATION: 99 % | HEART RATE: 90 BPM | BODY MASS INDEX: 27.5 KG/M2 | HEIGHT: 64 IN | SYSTOLIC BLOOD PRESSURE: 116 MMHG | WEIGHT: 161.1 LBS

## 2021-03-29 DIAGNOSIS — H61.22 IMPACTED CERUMEN OF LEFT EAR: ICD-10-CM

## 2021-03-29 DIAGNOSIS — J03.90 TONSILLITIS: ICD-10-CM

## 2021-03-29 DIAGNOSIS — J02.8 PHARYNGITIS DUE TO OTHER ORGANISM: ICD-10-CM

## 2021-03-29 DIAGNOSIS — J01.80 ACUTE NON-RECURRENT SINUSITIS OF OTHER SINUS: Primary | ICD-10-CM

## 2021-03-29 PROBLEM — J02.9 PHARYNGITIS: Status: ACTIVE | Noted: 2021-03-29

## 2021-03-29 PROCEDURE — 3008F BODY MASS INDEX DOCD: CPT | Performed by: INTERNAL MEDICINE

## 2021-03-29 PROCEDURE — 99214 OFFICE O/P EST MOD 30 MIN: CPT | Performed by: INTERNAL MEDICINE

## 2021-03-29 RX ORDER — LEVOFLOXACIN 500 MG/1
500 TABLET, FILM COATED ORAL EVERY 24 HOURS
Qty: 7 TABLET | Refills: 0 | Status: SHIPPED | OUTPATIENT
Start: 2021-03-29 | End: 2021-04-05

## 2021-03-29 NOTE — PROGRESS NOTES
Assessment/Plan:    Tonsillitis with pharyngitis  - recurrent for the past 3 months , this is the 3rd infection  Patient has been treated with both amoxicillin and Keflex  - we will start pt on levofloxacin 500 mg daily for 7 days,   - Pt was counseled to use OTC tylenol or ibuprofen with meals for aches and pains, warm salt water gargles for sore throat and was encouraged to drink lots of fluids, get plenty of rest and wash her hands liberally  - pt was also counseled to take antibiotics with food and also to use a probiotic like yogurt daily as long as she is taking antibiotics  -because her symptoms have been recurrent, we will refer patient to ENT and she may follow up with them if her symptoms reoccur  - She may also return to the clinic if her symptoms worsen or do not improve  Maxillary sinusitis  -will treat patient with levofloxacin as above    Impacted cerumen of the left ear  -will prescribe Debrox eardrops to be used for 5 days  -patient will return to the office for an ear cleaning if needed    BMI 27 65  -diet and exercise counseling given     Diagnoses and all orders for this visit:    Acute non-recurrent sinusitis of other sinus  -     levofloxacin (LEVAQUIN) 500 mg tablet; Take 1 tablet (500 mg total) by mouth every 24 hours for 7 days  -     Ambulatory Referral to Otolaryngology; Future    Pharyngitis due to other organism  -     levofloxacin (LEVAQUIN) 500 mg tablet; Take 1 tablet (500 mg total) by mouth every 24 hours for 7 days  -     Ambulatory Referral to Otolaryngology; Future    Tonsillitis  -     levofloxacin (LEVAQUIN) 500 mg tablet; Take 1 tablet (500 mg total) by mouth every 24 hours for 7 days  -     Ambulatory Referral to Otolaryngology; Future    Impacted cerumen of left ear  -     carbamide peroxide (DEBROX) 6 5 % otic solution; Administer 5 drops into the left ear 2 (two) times a day Use for 5 days and may return to the office for an ear cleaning      BMI 27 0-27 9,adult BMI Counseling: Body mass index is 27 65 kg/m²  The BMI is above normal  Nutrition recommendations include encouraging healthy choices of fruits and vegetables, limiting drinks that contain sugar and reducing intake of saturated and trans fat  Exercise recommendations include moderate physical activity 150 minutes/week  No pharmacotherapy was ordered  Subjective:      Patient ID: Christi Ruiz is a 28 y o  female  HPI  Patient presents with complaints of recurrent tonsillar swelling for the past 3 months  She states that she was sick in December, about 3 months ago, with tonsillar swelling and other symptoms and went to an urgent care center and got antibiotics  She believes that she was given amoxicillin and her symptoms resolved  About 20 days ago, she felt a recurrence of the tonsillar swelling and called our office and had a telemedicine visit and was prescribed Keflex and her symptoms resolved for a while then a week ago her symptoms also started again with tonsillar swelling, a foreign body sensation dysphagia  She also admits to left-sided ear as well as a clicking sensation in her bilateral ears occasional since she was sick about 3 months ago  She states that her symptoms are worse at night  She denies fever, chills, night sweats, nasal congestion, rhinorrhea, postnasal drip, cough, shortness of breath, chest pain, palpitations, nausea, vomiting, abdominal pain, diarrhea, constipation, myalgias, arthralgias  The following portions of the patient's history were reviewed and updated as appropriate:   She  has no past medical history on file    She   Patient Active Problem List    Diagnosis Date Noted    Tonsillitis 03/29/2021    Pharyngitis 90/05/3559    Periumbilical abdominal pain 09/09/2020    Other constipation 09/09/2020    Annual physical exam 07/31/2020    Dyspepsia 07/31/2020    Gastroesophageal reflux disease without esophagitis 07/31/2020    Sebaceous cyst of left lower leg 07/31/2020    URTI (acute upper respiratory infection) 03/16/2020    Other acute sinusitis 03/16/2020    BMI 27 0-27 9,adult 03/16/2020     She  has a past surgical history that includes [place] tooth extraction (2013)  Her family history includes Gout in her father  She  reports that she has never smoked  She has never used smokeless tobacco  She reports current alcohol use  She reports that she does not use drugs  Current Outpatient Medications   Medication Sig Dispense Refill    norethindrone-ethinyl estradiol (JUNEL FE 1/20) 1-20 MG-MCG per tablet Take 1 tablet by mouth daily      BLISOVI FE 1/20 1-20 MG-MCG per tablet Take 1 tablet by mouth daily       carbamide peroxide (DEBROX) 6 5 % otic solution Administer 5 drops into the left ear 2 (two) times a day Use for 5 days and may return to the office for an ear cleaning  15 mL 0    famotidine (PEPCID) 20 mg tablet Take 1 tablet (20 mg total) by mouth 2 (two) times a day (Patient not taking: Reported on 3/9/2021) 60 tablet 0    levofloxacin (LEVAQUIN) 500 mg tablet Take 1 tablet (500 mg total) by mouth every 24 hours for 7 days 7 tablet 0     No current facility-administered medications for this visit  Current Outpatient Medications on File Prior to Visit   Medication Sig    norethindrone-ethinyl estradiol (JUNEL FE 1/20) 1-20 MG-MCG per tablet Take 1 tablet by mouth daily    BLISOVI FE 1/20 1-20 MG-MCG per tablet Take 1 tablet by mouth daily     famotidine (PEPCID) 20 mg tablet Take 1 tablet (20 mg total) by mouth 2 (two) times a day (Patient not taking: Reported on 3/9/2021)     No current facility-administered medications on file prior to visit  She has No Known Allergies       Review of Systems   Constitutional: Negative for activity change, chills, fatigue, fever and unexpected weight change     HENT: Positive for ear pain (left more than right with clicking of both ears) and sore throat (mild sore throat with swollen tonsils and a foreign body sensation in her throat for one week, had a similar occuerrenc 2 days ago)  Negative for postnasal drip, rhinorrhea and sinus pressure  Eyes: Negative for pain  Respiratory: Negative for cough, choking, chest tightness, shortness of breath and wheezing  Cardiovascular: Negative for chest pain, palpitations and leg swelling  Gastrointestinal: Negative for abdominal pain, constipation, diarrhea, nausea and vomiting  Genitourinary: Negative for dysuria and hematuria  Musculoskeletal: Negative for arthralgias, back pain, gait problem, joint swelling, myalgias and neck stiffness  Skin: Negative for pallor and rash  Neurological: Negative for dizziness, tremors, seizures, syncope, light-headedness and headaches  Hematological: Negative for adenopathy  Psychiatric/Behavioral: Negative for behavioral problems  Objective:      /88 (BP Location: Left arm, Patient Position: Sitting, Cuff Size: Standard)   Pulse 90   Temp 99 °F (37 2 °C) (Temporal)   Ht 5' 4" (1 626 m)   Wt 73 1 kg (161 lb 1 6 oz)   SpO2 99%   BMI 27 65 kg/m²          Physical Exam  Constitutional:       General: She is not in acute distress  Appearance: She is well-developed  She is not diaphoretic  HENT:      Head: Normocephalic and atraumatic  Right Ear: External ear normal       Left Ear: External ear normal  There is impacted cerumen (Cerumen impaction of the left external auditory canal with erythema of the right external auditory canal without tympanic membrane erythema)  Ears:      Comments: erythema of the ext aud cannal on the right     Nose:      Right Sinus: Maxillary sinus tenderness ( maxillary sinus tenderness on tapping) present  Mouth/Throat:      Mouth: Mucous membranes are dry  Pharynx: Posterior oropharyngeal erythema (Oropharyngeal erythema with swollen tonsil on the right) present  No oropharyngeal exudate  Tonsils: 3+ on the right     Eyes: General: No scleral icterus  Right eye: No discharge  Left eye: No discharge  Conjunctiva/sclera: Conjunctivae normal       Pupils: Pupils are equal, round, and reactive to light  Neck:      Musculoskeletal: Normal range of motion and neck supple  Thyroid: No thyromegaly  Vascular: No JVD  Trachea: No tracheal deviation  Cardiovascular:      Rate and Rhythm: Normal rate and regular rhythm  Heart sounds: Normal heart sounds  No murmur  No friction rub  No gallop  Pulmonary:      Effort: Pulmonary effort is normal  No respiratory distress  Breath sounds: Normal breath sounds  No wheezing or rales  Chest:      Chest wall: No tenderness  Abdominal:      General: Bowel sounds are normal  There is no distension  Palpations: Abdomen is soft  There is no mass  Tenderness: There is no abdominal tenderness  There is no guarding or rebound  Musculoskeletal: Normal range of motion  General: No tenderness or deformity  Lymphadenopathy:      Head:      Right side of head: Submandibular (tender) adenopathy present  Cervical: No cervical adenopathy  Skin:     General: Skin is warm and dry  Coloration: Skin is not pale  Findings: No erythema or rash  Neurological:      Mental Status: She is alert and oriented to person, place, and time  Cranial Nerves: No cranial nerve deficit  Motor: No abnormal muscle tone  Coordination: Coordination normal       Deep Tendon Reflexes: Reflexes are normal and symmetric     Psychiatric:         Behavior: Behavior normal            Orders Only on 03/10/2021   Component Date Value Ref Range Status    SARS-CoV-2 03/10/2021 Negative  Negative Final   Orders Only on 12/03/2020   Component Date Value Ref Range Status    SARS-COV-2 12/03/2020 Positive* Not Detected, Negative, Inconclusive Final   Appointment on 09/09/2020   Component Date Value Ref Range Status    Lipase 09/09/2020 42 96 - 958 u/L Final    WBC 09/09/2020 7 70  4 31 - 10 16 Thousand/uL Final    RBC 09/09/2020 5 18* 3 81 - 5 12 Million/uL Final    Hemoglobin 09/09/2020 14 6  11 5 - 15 4 g/dL Final    Hematocrit 09/09/2020 44 3  34 8 - 46 1 % Final    MCV 09/09/2020 86  82 - 98 fL Final    MCH 09/09/2020 28 2  26 8 - 34 3 pg Final    MCHC 09/09/2020 33 0  31 4 - 37 4 g/dL Final    RDW 09/09/2020 11 7  11 6 - 15 1 % Final    MPV 09/09/2020 9 7  8 9 - 12 7 fL Final    Platelets 22/39/2808 330  149 - 390 Thousands/uL Final    nRBC 09/09/2020 0  /100 WBCs Final    Neutrophils Relative 09/09/2020 56  43 - 75 % Final    Immat GRANS % 09/09/2020 0  0 - 2 % Final    Lymphocytes Relative 09/09/2020 35  14 - 44 % Final    Monocytes Relative 09/09/2020 7  4 - 12 % Final    Eosinophils Relative 09/09/2020 1  0 - 6 % Final    Basophils Relative 09/09/2020 1  0 - 1 % Final    Neutrophils Absolute 09/09/2020 4 33  1 85 - 7 62 Thousands/µL Final    Immature Grans Absolute 09/09/2020 0 02  0 00 - 0 20 Thousand/uL Final    Lymphocytes Absolute 09/09/2020 2 70  0 60 - 4 47 Thousands/µL Final    Monocytes Absolute 09/09/2020 0 55  0 17 - 1 22 Thousand/µL Final    Eosinophils Absolute 09/09/2020 0 05  0 00 - 0 61 Thousand/µL Final    Basophils Absolute 09/09/2020 0 05  0 00 - 0 10 Thousands/µL Final    Sodium 09/09/2020 140  136 - 145 mmol/L Final    Potassium 09/09/2020 4 0  3 5 - 5 3 mmol/L Final    Chloride 09/09/2020 108  100 - 108 mmol/L Final    CO2 09/09/2020 26  21 - 32 mmol/L Final    ANION GAP 09/09/2020 6  4 - 13 mmol/L Final    BUN 09/09/2020 14  5 - 25 mg/dL Final    Creatinine 09/09/2020 0 87  0 60 - 1 30 mg/dL Final    Standardized to IDMS reference method    Glucose, Fasting 09/09/2020 79  65 - 99 mg/dL Final    Specimen collection should occur prior to Sulfasalazine administration due to the potential for falsely depressed results   Specimen collection should occur prior to Sulfapyridine administration due to the potential for falsely elevated results   Calcium 09/09/2020 8 9  8 3 - 10 1 mg/dL Final    AST 09/09/2020 29  5 - 45 U/L Final    Specimen collection should occur prior to Sulfasalazine administration due to the potential for falsely depressed results   ALT 09/09/2020 53  12 - 78 U/L Final    Specimen collection should occur prior to Sulfasalazine and/or Sulfapyridine administration due to the potential for falsely depressed results   Alkaline Phosphatase 09/09/2020 67  46 - 116 U/L Final    Total Protein 09/09/2020 7 9  6 4 - 8 2 g/dL Final    Albumin 09/09/2020 4 2  3 5 - 5 0 g/dL Final    Total Bilirubin 09/09/2020 0 22  0 20 - 1 00 mg/dL Final    Use of this assay is not recommended for patients undergoing treatment with eltrombopag due to the potential for falsely elevated results   eGFR 09/09/2020 90  ml/min/1 73sq m Final    TSH 3RD GENERATON 09/09/2020 1 950  0 358 - 3 740 uIU/mL Final    The recommended reference ranges for TSH during pregnancy are as follows:   First trimester 0 1 to 2 5 uIU/mL   Second trimester  0 2 to 3 0 uIU/mL   Third trimester 0 3 to 3 0 uIU/m    Note: Normal ranges may not apply to patients who are transgender, non-binary, or whose legal sex, sex at birth, and gender identity differ  Using supplements with high doses of biotin 20 to more than 300 times greater than the adequate daily intake for adults of 30 mcg/day as established by the [employer], can cause falsely depress results      Cholesterol 09/09/2020 177  50 - 200 mg/dL Final    Cholesterol:       Desirable         <200 mg/dl       Borderline         200-239 mg/dl       High              >239           Triglycerides 09/09/2020 91  <=150 mg/dL Final    Triglyceride:     Normal          <150 mg/dl     Borderline High 150-199 mg/dl     High            200-499 mg/dl        Very High       >499 mg/dl    Specimen collection should occur prior to N-Acetylcysteine or Metamizole administration due to the potential for falsely depressed results   HDL, Direct 09/09/2020 51  >=40 mg/dL Final    HDL Cholesterol:       Low     <41 mg/dL  Specimen collection should occur prior to Metamizole administration due to the potential for falsley depressed results   LDL Calculated 09/09/2020 108* 0 - 100 mg/dL Final    LDL Cholesterol:     Optimal           <100 mg/dl     Near Optimal      100-129 mg/dl     Above Optimal       Borderline High 130-159 mg/dl       High            160-189 mg/dl       Very High       >189 mg/dl         This screening LDL is a calculated result  It does not have the accuracy of the Direct Measured LDL in the monitoring of patients with hyperlipidemia and/or statin therapy  Direct Measure LDL (KPJ695) must be ordered separately in these patients   Non-HDL-Chol (CHOL-HDL) 09/09/2020 126  mg/dl Final   Orders Only on 08/07/2020   Component Date Value Ref Range Status    H  Pylori Ag, EIA, Stool 08/07/2020    Final    Comment:   HELICOBACTER PYLORI AG, EIA, STOOL         Micro Number:      [id number]    Test Status:       Final    Specimen Source:   STOOL    Specimen Quality:  Adequate    H pylori Ag:       Not Detected                                               Antimicrobials, proton pump inhibitors, and                       bismuth preparations inhibit H  pylori and                       ingestion up to two weeks prior to testing may                       cause false negative results  If clinically                       indicated the test should be repeated on a new                       specimen obtained two weeks after discontinuing                       treatment       Orders Only on 08/07/2020   Component Date Value Ref Range Status    Total Cholesterol 08/07/2020 148  <200 mg/dL Final    HDL 08/07/2020 51  > OR = 50 mg/dL Final    Triglycerides 08/07/2020 94  <150 mg/dL Final    LDL Calculated 08/07/2020 79  mg/dL (calc) Final    Comment: Reference range: <100     Desirable range <100 mg/dL for primary prevention;    <70 mg/dL for patients with CHD or diabetic patients   with > or = 2 CHD risk factors  LDL-C is now calculated using the Toni-Naranjo   calculation, which is a validated novel method providing   better accuracy than the Friedewald equation in the   estimation of LDL-C  Abraham Mirza  Joi Monteiro  8244;168(24): 8931-1957   (http://WorkTouch/faq/YNS849)      Chol HDLC Ratio 08/07/2020 2 9  <5 0 (calc) Final    Non-HDL Cholesterol 08/07/2020 97  <130 mg/dL (calc) Final    Comment: For patients with diabetes plus 1 major ASCVD risk   factor, treating to a non-HDL-C goal of <100 mg/dL   (LDL-C of <70 mg/dL) is considered a therapeutic   option        Glucose, Random 08/07/2020 83  65 - 99 mg/dL Final    Comment:               Fasting reference interval         BUN 08/07/2020 12  7 - 25 mg/dL Final    Creatinine 08/07/2020 0 88  0 50 - 1 10 mg/dL Final    eGFR Non  08/07/2020 89  > OR = 60 mL/min/1 73m2 Final    eGFR  08/07/2020 103  > OR = 60 mL/min/1 73m2 Final    SL AMB BUN/CREATININE RATIO 77/06/6281 NOT APPLICABLE  6 - 22 (calc) Final    Sodium 08/07/2020 141  135 - 146 mmol/L Final    Potassium 08/07/2020 4 0  3 5 - 5 3 mmol/L Final    Chloride 08/07/2020 108  98 - 110 mmol/L Final    CO2 08/07/2020 24  20 - 32 mmol/L Final    Calcium 08/07/2020 9 0  8 6 - 10 2 mg/dL Final    Protein, Total 08/07/2020 6 6  6 1 - 8 1 g/dL Final    Albumin 08/07/2020 4 2  3 6 - 5 1 g/dL Final    Globulin 08/07/2020 2 4  1 9 - 3 7 g/dL (calc) Final    Albumin/Globulin Ratio 08/07/2020 1 8  1 0 - 2 5 (calc) Final    TOTAL BILIRUBIN 08/07/2020 0 3  0 2 - 1 2 mg/dL Final    Alkaline Phosphatase 08/07/2020 56  31 - 125 U/L Final    AST 08/07/2020 18  10 - 30 U/L Final    ALT 08/07/2020 23  6 - 29 U/L Final    White Blood Cell Count 08/07/2020 7 4  3 8 - 10 8 Thousand/uL Final    Red Blood Cell Count 08/07/2020 4 93 3 80 - 5 10 Million/uL Final    Hemoglobin 08/07/2020 14 0  11 7 - 15 5 g/dL Final    HCT 08/07/2020 42 3  35 0 - 45 0 % Final    MCV 08/07/2020 85 8  80 0 - 100 0 fL Final    MCH 08/07/2020 28 4  27 0 - 33 0 pg Final    MCHC 08/07/2020 33 1  32 0 - 36 0 g/dL Final    RDW 08/07/2020 12 0  11 0 - 15 0 % Final    Platelet Count 38/21/0462 286  140 - 400 Thousand/uL Final    SL AMB MPV 08/07/2020 9 9  7 5 - 12 5 fL Final    Neutrophils (Absolute) 08/07/2020 3,574  1,500 - 7,800 cells/uL Final    Lymphocytes (Absolute) 08/07/2020 3,145  850 - 3,900 cells/uL Final    Monocytes (Absolute) 08/07/2020 518  200 - 950 cells/uL Final    Eosinophils (Absolute) 08/07/2020 111  15 - 500 cells/uL Final    Basophils ABS 08/07/2020 52  0 - 200 cells/uL Final    Neutrophils 08/07/2020 48 3  % Final    Lymphocytes 08/07/2020 42 5  % Final    Monocytes 08/07/2020 7 0  % Final    Eosinophils 08/07/2020 1 5  % Final    Basophils PCT 08/07/2020 0 7  % Final    TSH W/RFX TO FREE T4 08/07/2020 4 07  mIU/L Final    Comment:           Reference Range                         > or = 20 Years  0 40-4 50                              Pregnancy Ranges            First trimester    0 26-2 66            Second trimester   0 55-2 73            Third trimester    0 43-2 91

## 2021-03-29 NOTE — TELEPHONE ENCOUNTER
Patient will call back to scheduled her physical   She wants to schedule it with her boyfriend as well at the same day    She has to check her schedule and his and call back to schedule the physical

## 2021-04-05 ENCOUNTER — OFFICE VISIT (OUTPATIENT)
Dept: INTERNAL MEDICINE CLINIC | Age: 31
End: 2021-04-05
Payer: COMMERCIAL

## 2021-04-05 VITALS
OXYGEN SATURATION: 98 % | HEART RATE: 96 BPM | WEIGHT: 160.4 LBS | SYSTOLIC BLOOD PRESSURE: 126 MMHG | HEIGHT: 64 IN | BODY MASS INDEX: 27.39 KG/M2 | DIASTOLIC BLOOD PRESSURE: 68 MMHG | TEMPERATURE: 99.5 F

## 2021-04-05 DIAGNOSIS — H61.22 IMPACTED CERUMEN OF LEFT EAR: ICD-10-CM

## 2021-04-05 DIAGNOSIS — J01.80 ACUTE NON-RECURRENT SINUSITIS OF OTHER SINUS: ICD-10-CM

## 2021-04-05 DIAGNOSIS — J06.9 URTI (ACUTE UPPER RESPIRATORY INFECTION): Primary | ICD-10-CM

## 2021-04-05 PROCEDURE — 1036F TOBACCO NON-USER: CPT | Performed by: INTERNAL MEDICINE

## 2021-04-05 PROCEDURE — 69210 REMOVE IMPACTED EAR WAX UNI: CPT | Performed by: INTERNAL MEDICINE

## 2021-04-05 PROCEDURE — 3008F BODY MASS INDEX DOCD: CPT | Performed by: INTERNAL MEDICINE

## 2021-04-05 PROCEDURE — 99214 OFFICE O/P EST MOD 30 MIN: CPT | Performed by: INTERNAL MEDICINE

## 2021-04-05 NOTE — PROGRESS NOTES
Assessment/Plan:    Upper respiratory tract infection with sinusitis   - improving on levofloxacin   - complete dose of antibiotics   - continue to keep well hydrated  -continue to wash hands liberally with soap and water for at least 20 seconds at a time  -continue with probiotics as longer as patient is taking antibiotics     Cerumen impaction of the left ear   -  Cerumen removal was done using curette and irrigation with removal of copious amounts of ear wax  - patient tolerated procedure without any adverse reactions  - hearing was improved status post ear lavage      Ear cerumen removal    Date/Time: 4/5/2021 4:54 PM  Performed by: Robson Johns DO  Authorized by: Robson Johns DO   Universal Protocol:  Consent: Verbal consent obtained  Written consent not obtained  Patient location:  Clinic  Procedure details:     Local anesthetic:  None    Location:  L ear    Procedure type: curette      Procedure type comment:  Curette with irrigation    Approach:  Natural orifice  Post-procedure details:     Complication:  None    Hearing quality:  Improved    Patient tolerance of procedure: Tolerated well, no immediate complications         Diagnoses and all orders for this visit:    URTI (acute upper respiratory infection)    Acute non-recurrent sinusitis of other sinus    Impacted cerumen of left ear    Other orders  -     Ear cerumen removal          Subjective:      Patient ID: Cornelia Sanchez is a 28 y o  female  HPI  Patient presents for a follow-up visit regarding her recent upper respiratory tract infection and cerumen impaction  She was given antibiotics at her last visit and states that most of her symptoms are much improved  She states that she used the Debrox eardrops a few days and stopped using it  She still has occasional pain and decreased hearing on her left ear    She denies fever, chills, night sweats, chest pain, shortness of breath, palpitations, nausea, vomiting abdominal pain, diarrhea, constipation, myalgias, arthralgias  She states that her sore throat, nasal congestion and rhinorrhea have resolved  The following portions of the patient's history were reviewed and updated as appropriate:   She  has no past medical history on file  She   Patient Active Problem List    Diagnosis Date Noted    Impacted cerumen of left ear 04/05/2021    Tonsillitis 03/29/2021    Pharyngitis 08/54/6358    Periumbilical abdominal pain 09/09/2020    Other constipation 09/09/2020    Annual physical exam 07/31/2020    Dyspepsia 07/31/2020    Gastroesophageal reflux disease without esophagitis 07/31/2020    Sebaceous cyst of left lower leg 07/31/2020    URTI (acute upper respiratory infection) 03/16/2020    Other acute sinusitis 03/16/2020    BMI 27 0-27 9,adult 03/16/2020     She  has a past surgical history that includes [place] tooth extraction (2013)  Her family history includes Gout in her father  She  reports that she has never smoked  She has never used smokeless tobacco  She reports current alcohol use  She reports that she does not use drugs  Current Outpatient Medications   Medication Sig Dispense Refill    BLISOVI FE 1/20 1-20 MG-MCG per tablet Take 1 tablet by mouth daily       famotidine (PEPCID) 20 mg tablet Take 1 tablet (20 mg total) by mouth 2 (two) times a day (Patient not taking: Reported on 3/9/2021) 60 tablet 0    levofloxacin (LEVAQUIN) 500 mg tablet Take 1 tablet (500 mg total) by mouth every 24 hours for 7 days 7 tablet 0    norethindrone-ethinyl estradiol (JUNEL FE 1/20) 1-20 MG-MCG per tablet Take 1 tablet by mouth daily       No current facility-administered medications for this visit        Current Outpatient Medications on File Prior to Visit   Medication Sig    BLISOVI FE 1/20 1-20 MG-MCG per tablet Take 1 tablet by mouth daily     famotidine (PEPCID) 20 mg tablet Take 1 tablet (20 mg total) by mouth 2 (two) times a day (Patient not taking: Reported on 3/9/2021)    levofloxacin (LEVAQUIN) 500 mg tablet Take 1 tablet (500 mg total) by mouth every 24 hours for 7 days    norethindrone-ethinyl estradiol (JUNEL FE 1/20) 1-20 MG-MCG per tablet Take 1 tablet by mouth daily    [DISCONTINUED] carbamide peroxide (DEBROX) 6 5 % otic solution Administer 5 drops into the left ear 2 (two) times a day Use for 5 days and may return to the office for an ear cleaning  (Patient not taking: Reported on 4/5/2021)     No current facility-administered medications on file prior to visit  She has No Known Allergies       Review of Systems   Constitutional: Negative for activity change, chills, fatigue, fever and unexpected weight change  HENT: Positive for ear pain (Left-sided)  Negative for postnasal drip, rhinorrhea, sinus pressure and sore throat  Eyes: Negative for pain  Respiratory: Negative for cough, choking, chest tightness, shortness of breath and wheezing  Cardiovascular: Negative for chest pain, palpitations and leg swelling  Gastrointestinal: Negative for abdominal pain, constipation, diarrhea, nausea and vomiting  Genitourinary: Negative for dysuria and hematuria  Musculoskeletal: Negative for arthralgias, back pain, gait problem, joint swelling, myalgias and neck stiffness  Skin: Negative for pallor and rash  Neurological: Negative for dizziness, tremors, seizures, syncope, light-headedness and headaches  Hematological: Negative for adenopathy  Psychiatric/Behavioral: Negative for behavioral problems  Objective:      /68 (BP Location: Left arm, Patient Position: Sitting, Cuff Size: Standard)   Pulse 96   Temp 99 5 °F (37 5 °C) (Temporal)   Ht 5' 4" (1 626 m)   Wt 72 8 kg (160 lb 6 4 oz)   SpO2 98%   BMI 27 53 kg/m²          Physical Exam  Constitutional:       General: She is not in acute distress  Appearance: She is well-developed  She is not diaphoretic  HENT:      Head: Normocephalic and atraumatic        Right Ear: External ear normal       Left Ear: External ear normal  Decreased hearing noted  There is impacted cerumen (Cerumen impaction of the left external auditory canal)  Nose: Nose normal       Mouth/Throat:      Mouth: Mucous membranes are dry  Pharynx: Posterior oropharyngeal erythema ( improved oropharyngeal erythema) present  No oropharyngeal exudate  Eyes:      General: No scleral icterus  Right eye: No discharge  Left eye: No discharge  Conjunctiva/sclera: Conjunctivae normal       Pupils: Pupils are equal, round, and reactive to light  Neck:      Musculoskeletal: Normal range of motion and neck supple  Thyroid: No thyromegaly  Vascular: No JVD  Trachea: No tracheal deviation  Cardiovascular:      Rate and Rhythm: Normal rate and regular rhythm  Heart sounds: Normal heart sounds  No murmur  No friction rub  No gallop  Pulmonary:      Effort: Pulmonary effort is normal  No respiratory distress  Breath sounds: Normal breath sounds  No wheezing or rales  Chest:      Chest wall: No tenderness  Abdominal:      General: Bowel sounds are normal  There is no distension  Palpations: Abdomen is soft  There is no mass  Tenderness: There is no abdominal tenderness  There is no guarding or rebound  Musculoskeletal: Normal range of motion  General: No tenderness or deformity  Lymphadenopathy:      Cervical: No cervical adenopathy  Skin:     General: Skin is warm and dry  Coloration: Skin is not pale  Findings: No erythema or rash  Neurological:      Mental Status: She is alert and oriented to person, place, and time  Cranial Nerves: No cranial nerve deficit  Motor: No abnormal muscle tone  Coordination: Coordination normal       Deep Tendon Reflexes: Reflexes are normal and symmetric     Psychiatric:         Behavior: Behavior normal            Orders Only on 03/10/2021   Component Date Value Ref Range Status    SARS-CoV-2 03/10/2021 Negative  Negative Final   Orders Only on 12/03/2020   Component Date Value Ref Range Status    SARS-COV-2 12/03/2020 Positive* Not Detected, Negative, Inconclusive Final   Appointment on 09/09/2020   Component Date Value Ref Range Status    Lipase 09/09/2020 89  73 - 393 u/L Final    WBC 09/09/2020 7 70  4 31 - 10 16 Thousand/uL Final    RBC 09/09/2020 5 18* 3 81 - 5 12 Million/uL Final    Hemoglobin 09/09/2020 14 6  11 5 - 15 4 g/dL Final    Hematocrit 09/09/2020 44 3  34 8 - 46 1 % Final    MCV 09/09/2020 86  82 - 98 fL Final    MCH 09/09/2020 28 2  26 8 - 34 3 pg Final    MCHC 09/09/2020 33 0  31 4 - 37 4 g/dL Final    RDW 09/09/2020 11 7  11 6 - 15 1 % Final    MPV 09/09/2020 9 7  8 9 - 12 7 fL Final    Platelets 76/16/7932 330  149 - 390 Thousands/uL Final    nRBC 09/09/2020 0  /100 WBCs Final    Neutrophils Relative 09/09/2020 56  43 - 75 % Final    Immat GRANS % 09/09/2020 0  0 - 2 % Final    Lymphocytes Relative 09/09/2020 35  14 - 44 % Final    Monocytes Relative 09/09/2020 7  4 - 12 % Final    Eosinophils Relative 09/09/2020 1  0 - 6 % Final    Basophils Relative 09/09/2020 1  0 - 1 % Final    Neutrophils Absolute 09/09/2020 4 33  1 85 - 7 62 Thousands/µL Final    Immature Grans Absolute 09/09/2020 0 02  0 00 - 0 20 Thousand/uL Final    Lymphocytes Absolute 09/09/2020 2 70  0 60 - 4 47 Thousands/µL Final    Monocytes Absolute 09/09/2020 0 55  0 17 - 1 22 Thousand/µL Final    Eosinophils Absolute 09/09/2020 0 05  0 00 - 0 61 Thousand/µL Final    Basophils Absolute 09/09/2020 0 05  0 00 - 0 10 Thousands/µL Final    Sodium 09/09/2020 140  136 - 145 mmol/L Final    Potassium 09/09/2020 4 0  3 5 - 5 3 mmol/L Final    Chloride 09/09/2020 108  100 - 108 mmol/L Final    CO2 09/09/2020 26  21 - 32 mmol/L Final    ANION GAP 09/09/2020 6  4 - 13 mmol/L Final    BUN 09/09/2020 14  5 - 25 mg/dL Final    Creatinine 09/09/2020 0 87  0 60 - 1 30 mg/dL Final    Standardized to IDMS reference method    Glucose, Fasting 09/09/2020 79  65 - 99 mg/dL Final    Specimen collection should occur prior to Sulfasalazine administration due to the potential for falsely depressed results  Specimen collection should occur prior to Sulfapyridine administration due to the potential for falsely elevated results   Calcium 09/09/2020 8 9  8 3 - 10 1 mg/dL Final    AST 09/09/2020 29  5 - 45 U/L Final    Specimen collection should occur prior to Sulfasalazine administration due to the potential for falsely depressed results   ALT 09/09/2020 53  12 - 78 U/L Final    Specimen collection should occur prior to Sulfasalazine and/or Sulfapyridine administration due to the potential for falsely depressed results   Alkaline Phosphatase 09/09/2020 67  46 - 116 U/L Final    Total Protein 09/09/2020 7 9  6 4 - 8 2 g/dL Final    Albumin 09/09/2020 4 2  3 5 - 5 0 g/dL Final    Total Bilirubin 09/09/2020 0 22  0 20 - 1 00 mg/dL Final    Use of this assay is not recommended for patients undergoing treatment with eltrombopag due to the potential for falsely elevated results   eGFR 09/09/2020 90  ml/min/1 73sq m Final    TSH 3RD GENERATON 09/09/2020 1 950  0 358 - 3 740 uIU/mL Final    The recommended reference ranges for TSH during pregnancy are as follows:   First trimester 0 1 to 2 5 uIU/mL   Second trimester  0 2 to 3 0 uIU/mL   Third trimester 0 3 to 3 0 uIU/m    Note: Normal ranges may not apply to patients who are transgender, non-binary, or whose legal sex, sex at birth, and gender identity differ  Using supplements with high doses of biotin 20 to more than 300 times greater than the adequate daily intake for adults of 30 mcg/day as established by the [place] [employer], can cause falsely depress results      Cholesterol 09/09/2020 177  50 - 200 mg/dL Final    Cholesterol:       Desirable         <200 mg/dl       Borderline         200-239 mg/dl       High              >239           Triglycerides 09/09/2020 91  <=150 mg/dL Final    Triglyceride:     Normal          <150 mg/dl     Borderline High 150-199 mg/dl     High            200-499 mg/dl        Very High       >499 mg/dl    Specimen collection should occur prior to N-Acetylcysteine or Metamizole administration due to the potential for falsely depressed results   HDL, Direct 09/09/2020 51  >=40 mg/dL Final    HDL Cholesterol:       Low     <41 mg/dL  Specimen collection should occur prior to Metamizole administration due to the potential for falsley depressed results   LDL Calculated 09/09/2020 108* 0 - 100 mg/dL Final    LDL Cholesterol:     Optimal           <100 mg/dl     Near Optimal      100-129 mg/dl     Above Optimal       Borderline High 130-159 mg/dl       High            160-189 mg/dl       Very High       >189 mg/dl         This screening LDL is a calculated result  It does not have the accuracy of the Direct Measured LDL in the monitoring of patients with hyperlipidemia and/or statin therapy  Direct Measure LDL (IUB282) must be ordered separately in these patients   Non-HDL-Chol (CHOL-HDL) 09/09/2020 126  mg/dl Final   Orders Only on 08/07/2020   Component Date Value Ref Range Status    H  Pylori Ag, EIA, Stool 08/07/2020    Final    Comment:   HELICOBACTER PYLORI AG, EIA, STOOL         Micro Number:      [id number]    Test Status:       Final    Specimen Source:   STOOL    Specimen Quality:  Adequate    H pylori Ag:       Not Detected                                               Antimicrobials, proton pump inhibitors, and                       bismuth preparations inhibit H  pylori and                       ingestion up to two weeks prior to testing may                       cause false negative results  If clinically                       indicated the test should be repeated on a new                       specimen obtained two weeks after discontinuing                       treatment       Orders Only on 08/07/2020   Component Date Value Ref Range Status    Total Cholesterol 08/07/2020 148  <200 mg/dL Final    HDL 08/07/2020 51  > OR = 50 mg/dL Final    Triglycerides 08/07/2020 94  <150 mg/dL Final    LDL Calculated 08/07/2020 79  mg/dL (calc) Final    Comment: Reference range: <100     Desirable range <100 mg/dL for primary prevention;    <70 mg/dL for patients with CHD or diabetic patients   with > or = 2 CHD risk factors  LDL-C is now calculated using the Toni-Naranjo   calculation, which is a validated novel method providing   better accuracy than the Friedewald equation in the   estimation of LDL-C  Alee Cardenas  [place] [id number];[phone]   (http://[employer]/faq/RDI047)      Chol HDLC Ratio 08/07/2020 2 9  <5 0 (calc) Final    Non-HDL Cholesterol 08/07/2020 97  <130 mg/dL (calc) Final    Comment: For patients with diabetes plus 1 major ASCVD risk   factor, treating to a non-HDL-C goal of <100 mg/dL   (LDL-C of <70 mg/dL) is considered a therapeutic   option        Glucose, Random 08/07/2020 83  65 - 99 mg/dL Final    Comment:               Fasting reference interval         BUN 08/07/2020 12  7 - 25 mg/dL Final    Creatinine 08/07/2020 0 88  0 50 - 1 10 mg/dL Final    eGFR Non  08/07/2020 89  > OR = 60 mL/min/1 73m2 Final    eGFR  08/07/2020 103  > OR = 60 mL/min/1 73m2 Final    SL AMB BUN/CREATININE RATIO 49/47/0973 NOT APPLICABLE  6 - 22 (calc) Final    Sodium 08/07/2020 141  135 - 146 mmol/L Final    Potassium 08/07/2020 4 0  3 5 - 5 3 mmol/L Final    Chloride 08/07/2020 108  98 - 110 mmol/L Final    CO2 08/07/2020 24  20 - 32 mmol/L Final    Calcium 08/07/2020 9 0  8 6 - 10 2 mg/dL Final    Protein, Total 08/07/2020 6 6  6 1 - 8 1 g/dL Final    Albumin 08/07/2020 4 2  3 6 - 5 1 g/dL Final    Globulin 08/07/2020 2 4  1 9 - 3 7 g/dL (calc) Final    Albumin/Globulin Ratio 08/07/2020 1 8  1 0 - 2 5 (calc) Final    TOTAL BILIRUBIN 08/07/2020 0 3  0 2 - 1 2 mg/dL Final    Alkaline Phosphatase 08/07/2020 56  31 - 125 U/L Final    AST 08/07/2020 18  10 - 30 U/L Final    ALT 08/07/2020 23  6 - 29 U/L Final    White Blood Cell Count 08/07/2020 7 4  3 8 - 10 8 Thousand/uL Final    Red Blood Cell Count 08/07/2020 4 93  3 80 - 5 10 Million/uL Final    Hemoglobin 08/07/2020 14 0  11 7 - 15 5 g/dL Final    HCT 08/07/2020 42 3  35 0 - 45 0 % Final    MCV 08/07/2020 85 8  80 0 - 100 0 fL Final    MCH 08/07/2020 28 4  27 0 - 33 0 pg Final    MCHC 08/07/2020 33 1  32 0 - 36 0 g/dL Final    RDW 08/07/2020 12 0  11 0 - 15 0 % Final    Platelet Count 61/28/7393 286  140 - 400 Thousand/uL Final    SL AMB MPV 08/07/2020 9 9  7 5 - 12 5 fL Final    Neutrophils (Absolute) 08/07/2020 3,574  1,500 - 7,800 cells/uL Final    Lymphocytes (Absolute) 08/07/2020 3,145  850 - 3,900 cells/uL Final    Monocytes (Absolute) 08/07/2020 518  200 - 950 cells/uL Final    Eosinophils (Absolute) 08/07/2020 111  15 - 500 cells/uL Final    Basophils ABS 08/07/2020 52  0 - 200 cells/uL Final    Neutrophils 08/07/2020 48 3  % Final    Lymphocytes 08/07/2020 42 5  % Final    Monocytes 08/07/2020 7 0  % Final    Eosinophils 08/07/2020 1 5  % Final    Basophils PCT 08/07/2020 0 7  % Final    TSH W/RFX TO FREE T4 08/07/2020 4 07  mIU/L Final    Comment:           Reference Range                         > or = 20 Years  0 40-4 50                              Pregnancy Ranges            First trimester    0 26-2 66            Second trimester   0 55-2 73            Third trimester    0 43-2 91

## 2021-04-07 DIAGNOSIS — Z23 ENCOUNTER FOR IMMUNIZATION: ICD-10-CM

## 2021-04-21 ENCOUNTER — IMMUNIZATIONS (OUTPATIENT)
Dept: FAMILY MEDICINE CLINIC | Facility: HOSPITAL | Age: 31
End: 2021-04-21

## 2021-04-21 DIAGNOSIS — Z23 ENCOUNTER FOR IMMUNIZATION: Primary | ICD-10-CM

## 2021-04-21 PROCEDURE — 0001A SARS-COV-2 / COVID-19 MRNA VACCINE (PFIZER-BIONTECH) 30 MCG: CPT

## 2021-04-21 PROCEDURE — 91300 SARS-COV-2 / COVID-19 MRNA VACCINE (PFIZER-BIONTECH) 30 MCG: CPT

## 2021-05-19 ENCOUNTER — IMMUNIZATIONS (OUTPATIENT)
Dept: FAMILY MEDICINE CLINIC | Facility: HOSPITAL | Age: 31
End: 2021-05-19

## 2021-05-19 DIAGNOSIS — Z23 ENCOUNTER FOR IMMUNIZATION: Primary | ICD-10-CM

## 2021-05-19 PROCEDURE — 91300 SARS-COV-2 / COVID-19 MRNA VACCINE (PFIZER-BIONTECH) 30 MCG: CPT

## 2021-05-19 PROCEDURE — 0002A SARS-COV-2 / COVID-19 MRNA VACCINE (PFIZER-BIONTECH) 30 MCG: CPT

## 2021-10-06 ENCOUNTER — OFFICE VISIT (OUTPATIENT)
Dept: INTERNAL MEDICINE CLINIC | Age: 31
End: 2021-10-06
Payer: COMMERCIAL

## 2021-10-06 VITALS
SYSTOLIC BLOOD PRESSURE: 122 MMHG | HEART RATE: 79 BPM | DIASTOLIC BLOOD PRESSURE: 70 MMHG | OXYGEN SATURATION: 98 % | WEIGHT: 156.9 LBS | TEMPERATURE: 98.3 F | HEIGHT: 63 IN | BODY MASS INDEX: 27.8 KG/M2

## 2021-10-06 DIAGNOSIS — Z23 NEED FOR INFLUENZA VACCINATION: ICD-10-CM

## 2021-10-06 DIAGNOSIS — J45.991 COUGH VARIANT ASTHMA: ICD-10-CM

## 2021-10-06 DIAGNOSIS — Z00.00 ANNUAL PHYSICAL EXAM: Primary | ICD-10-CM

## 2021-10-06 DIAGNOSIS — K21.9 GASTROESOPHAGEAL REFLUX DISEASE WITHOUT ESOPHAGITIS: ICD-10-CM

## 2021-10-06 DIAGNOSIS — Z53.20 SCREENING FOR HEPATITIS C DECLINED: ICD-10-CM

## 2021-10-06 DIAGNOSIS — E78.49 OTHER HYPERLIPIDEMIA: ICD-10-CM

## 2021-10-06 PROBLEM — K59.09 OTHER CONSTIPATION: Status: RESOLVED | Noted: 2020-09-09 | Resolved: 2021-10-06

## 2021-10-06 PROBLEM — R10.33 PERIUMBILICAL ABDOMINAL PAIN: Status: RESOLVED | Noted: 2020-09-09 | Resolved: 2021-10-06

## 2021-10-06 PROBLEM — J01.80 OTHER ACUTE SINUSITIS: Status: RESOLVED | Noted: 2020-03-16 | Resolved: 2021-10-06

## 2021-10-06 PROBLEM — J03.90 TONSILLITIS: Status: RESOLVED | Noted: 2021-03-29 | Resolved: 2021-10-06

## 2021-10-06 PROBLEM — L72.3 SEBACEOUS CYST: Status: RESOLVED | Noted: 2020-07-31 | Resolved: 2021-10-06

## 2021-10-06 PROBLEM — J02.9 PHARYNGITIS: Status: RESOLVED | Noted: 2021-03-29 | Resolved: 2021-10-06

## 2021-10-06 PROBLEM — J06.9 URTI (ACUTE UPPER RESPIRATORY INFECTION): Status: RESOLVED | Noted: 2020-03-16 | Resolved: 2021-10-06

## 2021-10-06 PROBLEM — H61.22 IMPACTED CERUMEN OF LEFT EAR: Status: RESOLVED | Noted: 2021-04-05 | Resolved: 2021-10-06

## 2021-10-06 PROCEDURE — 90686 IIV4 VACC NO PRSV 0.5 ML IM: CPT | Performed by: INTERNAL MEDICINE

## 2021-10-06 PROCEDURE — 3008F BODY MASS INDEX DOCD: CPT | Performed by: INTERNAL MEDICINE

## 2021-10-06 PROCEDURE — 99214 OFFICE O/P EST MOD 30 MIN: CPT | Performed by: INTERNAL MEDICINE

## 2021-10-06 PROCEDURE — 90471 IMMUNIZATION ADMIN: CPT | Performed by: INTERNAL MEDICINE

## 2021-10-06 PROCEDURE — 99395 PREV VISIT EST AGE 18-39: CPT | Performed by: INTERNAL MEDICINE

## 2021-10-06 PROCEDURE — 1036F TOBACCO NON-USER: CPT | Performed by: INTERNAL MEDICINE

## 2021-10-06 RX ORDER — ALBUTEROL SULFATE 90 UG/1
2 AEROSOL, METERED RESPIRATORY (INHALATION) 4 TIMES DAILY PRN
Qty: 18 G | Refills: 1 | Status: SHIPPED | OUTPATIENT
Start: 2021-10-06

## 2021-10-06 RX ORDER — ALBUTEROL SULFATE 90 UG/1
2 POWDER, METERED RESPIRATORY (INHALATION) 4 TIMES DAILY PRN
Qty: 1 EACH | Refills: 1 | Status: SHIPPED | OUTPATIENT
Start: 2021-10-06 | End: 2021-10-06

## 2021-11-09 ENCOUNTER — TELEPHONE (OUTPATIENT)
Dept: INTERNAL MEDICINE CLINIC | Facility: CLINIC | Age: 31
End: 2021-11-09

## 2021-11-17 ENCOUNTER — TELEMEDICINE (OUTPATIENT)
Dept: INTERNAL MEDICINE CLINIC | Age: 31
End: 2021-11-17
Payer: COMMERCIAL

## 2021-11-17 ENCOUNTER — TELEPHONE (OUTPATIENT)
Dept: OTHER | Facility: OTHER | Age: 31
End: 2021-11-17

## 2021-11-17 VITALS — TEMPERATURE: 97.5 F | HEIGHT: 63 IN | BODY MASS INDEX: 27.64 KG/M2 | WEIGHT: 156 LBS

## 2021-11-17 DIAGNOSIS — J06.9 URTI (ACUTE UPPER RESPIRATORY INFECTION): ICD-10-CM

## 2021-11-17 DIAGNOSIS — J02.8 PHARYNGITIS DUE TO OTHER ORGANISM: Primary | ICD-10-CM

## 2021-11-17 PROCEDURE — 3008F BODY MASS INDEX DOCD: CPT | Performed by: INTERNAL MEDICINE

## 2021-11-17 PROCEDURE — 99213 OFFICE O/P EST LOW 20 MIN: CPT | Performed by: INTERNAL MEDICINE

## 2021-11-17 PROCEDURE — 1036F TOBACCO NON-USER: CPT | Performed by: INTERNAL MEDICINE

## 2021-11-17 RX ORDER — AMOXICILLIN AND CLAVULANATE POTASSIUM 875; 125 MG/1; MG/1
1 TABLET, FILM COATED ORAL EVERY 12 HOURS SCHEDULED
Qty: 14 TABLET | Refills: 0 | Status: SHIPPED | OUTPATIENT
Start: 2021-11-17 | End: 2021-11-24

## 2021-11-19 ENCOUNTER — TELEPHONE (OUTPATIENT)
Dept: INTERNAL MEDICINE CLINIC | Facility: CLINIC | Age: 31
End: 2021-11-19

## 2022-09-21 ENCOUNTER — OFFICE VISIT (OUTPATIENT)
Dept: INTERNAL MEDICINE CLINIC | Age: 32
End: 2022-09-21
Payer: COMMERCIAL

## 2022-09-21 VITALS
WEIGHT: 159.5 LBS | TEMPERATURE: 98.4 F | DIASTOLIC BLOOD PRESSURE: 76 MMHG | OXYGEN SATURATION: 98 % | BODY MASS INDEX: 28.26 KG/M2 | SYSTOLIC BLOOD PRESSURE: 120 MMHG | HEART RATE: 92 BPM | HEIGHT: 63 IN

## 2022-09-21 DIAGNOSIS — H53.143 PHOTOPHOBIA OF BOTH EYES: ICD-10-CM

## 2022-09-21 DIAGNOSIS — R51.9 HEADACHE ABOVE THE EYE REGION: Primary | ICD-10-CM

## 2022-09-21 PROBLEM — G44.229 CHRONIC TENSION-TYPE HEADACHE, NOT INTRACTABLE: Status: ACTIVE | Noted: 2022-09-21

## 2022-09-21 PROCEDURE — 3725F SCREEN DEPRESSION PERFORMED: CPT | Performed by: INTERNAL MEDICINE

## 2022-09-21 PROCEDURE — 99213 OFFICE O/P EST LOW 20 MIN: CPT | Performed by: INTERNAL MEDICINE

## 2022-09-21 NOTE — PROGRESS NOTES
Assessment/Plan:    Recurrent Headaches with photophobia  -headache is very likely related to her eyes and probably to the bright light at her job  -will refer patient to Ophthalmology  -she was counseled to try wearing goggles that are close to the face and block all entrance of light into her eyes since her glasses are able to block some of the light and improve the headache   -continue to keep well hydrated  -continue with multivitamins       Diagnoses and all orders for this visit:    Headache above the eye region  -     Ambulatory Referral to Ophthalmology; Future    Photophobia of both eyes  -     Ambulatory Referral to Ophthalmology; Future    BMI 28 0-28 9,adult          BMI Counseling: Body mass index is 28 25 kg/m²  The BMI is above normal  Nutrition recommendations include limiting drinks that contain sugar, reducing intake of saturated and trans fat and reducing intake of cholesterol  Exercise recommendations include moderate physical activity 150 minutes/week  No pharmacotherapy was ordered  Patient referred to PCP  Rationale for BMI follow-up plan is due to patient being overweight or obese  Depression Screening and Follow-up Plan: Patient was screened for depression during today's encounter  They screened negative with a PHQ-2 score of 1  Subjective:      Patient ID: Lise Ogden is a 33 y o  female  HPI  Patient presents with complains that she has been haivng headaches since the beginning of this year any time she plays bright video games and she stopped playing the games and the headaches resolved  Recently she states that at her job they changed the lights in her office and headaches started once more  The headches start the temples and radiate to the eyes  She states that she had the same headaches when she initially started working at her job and she dimmed the lights of her computer monitors and the headaches stopped at that time      she does not wear glasses and states that she sees very well  H/a is dull and a 2/10 and a max of 4-5/10 at work  She last saw an eye doctor a while ago   she drinks about 48 oz of water daily   She normally has a problem looking at lights and does not drive at night because she does not react well to the flash  She admits to photophobia when she has the headaches but denies nausea  She has never had migraine headaches  She takes one cup of coffee daily   She denies fever, chills, night sweats, dizziness, nasal congestion, rhinorrhea, sinus pain or pressure, sore throat, cough, chest pain, shortness of breath, palpitations, nausea, vomiting abdominal pain, diarrhea, constipation, myalgias, arthralgias  The following portions of the patient's history were reviewed and updated as appropriate:   She  has a past medical history of Allergic (2012), Anxiety (2018), Asthma, and Pneumonia (1997)  She   Patient Active Problem List    Diagnosis Date Noted    Chronic tension-type headache, not intractable 09/21/2022    Headache above the eye region 09/21/2022    Photophobia of both eyes 09/21/2022    Cough variant asthma 10/06/2021    Screening for hepatitis C declined 10/06/2021    Other hyperlipidemia 10/06/2021    Annual physical exam 07/31/2020    Dyspepsia 07/31/2020    Gastroesophageal reflux disease without esophagitis 07/31/2020    BMI 28 0-28 9,adult 03/16/2020     She  has a past surgical history that includes [place] tooth extraction (2013)  Her family history includes Breast cancer in her paternal grandmother; Gout in her father  She  reports that she has never smoked  She has never used smokeless tobacco  She reports current alcohol use of about 3 0 - 4 0 standard drinks of alcohol per week  She reports that she does not use drugs    Current Outpatient Medications   Medication Sig Dispense Refill    albuterol (Ventolin HFA) 90 mcg/act inhaler Inhale 2 puffs 4 (four) times a day as needed for wheezing or shortness of breath 18 g 1    norethindrone-ethinyl estradiol (JUNEL FE 1/20) 1-20 MG-MCG per tablet Take 1 tablet by mouth daily       No current facility-administered medications for this visit  Current Outpatient Medications on File Prior to Visit   Medication Sig    albuterol (Ventolin HFA) 90 mcg/act inhaler Inhale 2 puffs 4 (four) times a day as needed for wheezing or shortness of breath    norethindrone-ethinyl estradiol (JUNEL FE 1/20) 1-20 MG-MCG per tablet Take 1 tablet by mouth daily     No current facility-administered medications on file prior to visit  She has No Known Allergies       Review of Systems   Constitutional: Negative for activity change, chills, fatigue, fever and unexpected weight change  HENT: Negative for ear pain, postnasal drip, rhinorrhea, sinus pressure and sore throat  Eyes: Positive for photophobia  Negative for pain  Respiratory: Negative for cough, choking, chest tightness, shortness of breath and wheezing  Cardiovascular: Negative for chest pain, palpitations and leg swelling  Gastrointestinal: Negative for abdominal pain, constipation, diarrhea, nausea and vomiting  Genitourinary: Negative for dysuria and hematuria  Musculoskeletal: Negative for arthralgias, back pain, gait problem, joint swelling, myalgias and neck stiffness  Skin: Negative for pallor and rash  Neurological: Positive for headaches  Negative for dizziness, tremors, seizures, syncope and light-headedness  Hematological: Negative for adenopathy  Psychiatric/Behavioral: Negative for behavioral problems  Objective:      /76 (BP Location: Left arm, Patient Position: Sitting, Cuff Size: Standard)   Pulse 92   Temp 98 4 °F (36 9 °C) (Temporal)   Ht 5' 3" (1 6 m)   Wt 72 3 kg (159 lb 8 oz)   SpO2 98%   BMI 28 25 kg/m²          Physical Exam  Constitutional:       General: She is not in acute distress  Appearance: She is well-developed  She is not diaphoretic     HENT:      Head: Normocephalic and atraumatic  Right Ear: External ear normal       Left Ear: External ear normal       Nose: Nose normal       Mouth/Throat:      Mouth: Mucous membranes are dry  Pharynx: Posterior oropharyngeal erythema (Mild oropharyngeal erythema with dry mucous membranes) present  No oropharyngeal exudate  Eyes:      General: No scleral icterus  Right eye: No discharge  Left eye: No discharge  Conjunctiva/sclera: Conjunctivae normal       Pupils: Pupils are equal, round, and reactive to light  Neck:      Thyroid: No thyromegaly  Vascular: No JVD  Trachea: No tracheal deviation  Cardiovascular:      Rate and Rhythm: Normal rate and regular rhythm  Heart sounds: Normal heart sounds  No murmur heard  No friction rub  No gallop  Pulmonary:      Effort: Pulmonary effort is normal  No respiratory distress  Breath sounds: Normal breath sounds  No wheezing or rales  Chest:      Chest wall: No tenderness  Abdominal:      General: Bowel sounds are normal  There is no distension  Palpations: Abdomen is soft  There is no mass  Tenderness: There is no abdominal tenderness  There is no guarding or rebound  Musculoskeletal:         General: No tenderness or deformity  Normal range of motion  Cervical back: Normal range of motion and neck supple  Lymphadenopathy:      Cervical: No cervical adenopathy  Skin:     General: Skin is warm and dry  Coloration: Skin is not pale  Findings: No erythema or rash  Neurological:      Mental Status: She is alert and oriented to person, place, and time  Cranial Nerves: No cranial nerve deficit  Motor: No abnormal muscle tone  Coordination: Coordination normal       Deep Tendon Reflexes: Reflexes are normal and symmetric  Psychiatric:         Behavior: Behavior normal            No visits with results within 12 Month(s) from this visit     Latest known visit with results is:   Orders Only on 03/10/2021   Component Date Value Ref Range Status    SARS-CoV-2 03/10/2021 Negative  Negative Final

## 2022-09-22 ENCOUNTER — NEW PATIENT (OUTPATIENT)
Dept: URBAN - METROPOLITAN AREA CLINIC 6 | Facility: CLINIC | Age: 32
End: 2022-09-22

## 2022-09-22 DIAGNOSIS — R51.9: ICD-10-CM

## 2022-09-22 DIAGNOSIS — H53.143: ICD-10-CM

## 2022-09-22 DIAGNOSIS — H04.123: ICD-10-CM

## 2022-09-22 PROCEDURE — 92004 COMPRE OPH EXAM NEW PT 1/>: CPT

## 2022-09-22 ASSESSMENT — VISUAL ACUITY
OS_PH: 20/20
OS_SC: 20/30
OD_SC: J1+
OS_SC: J1+
OD_SC: 20/20

## 2022-09-22 ASSESSMENT — TONOMETRY
OS_IOP_MMHG: 20
OD_IOP_MMHG: 20

## 2023-01-13 ENCOUNTER — FOLLOW UP (OUTPATIENT)
Dept: URBAN - METROPOLITAN AREA CLINIC 6 | Facility: CLINIC | Age: 33
End: 2023-01-13

## 2023-01-13 DIAGNOSIS — R51.9: ICD-10-CM

## 2023-01-13 DIAGNOSIS — H04.123: ICD-10-CM

## 2023-01-13 DIAGNOSIS — H53.143: ICD-10-CM

## 2023-01-13 PROCEDURE — 92015 DETERMINE REFRACTIVE STATE: CPT

## 2023-01-13 PROCEDURE — 92012 INTRM OPH EXAM EST PATIENT: CPT

## 2023-01-13 ASSESSMENT — KERATOMETRY
OS_AXISANGLE_DEGREES: 003
OD_AXISANGLE2_DEGREES: 69
OS_K1POWER_DIOPTERS: 45.75
OS_K2POWER_DIOPTERS: 46.50
OD_K2POWER_DIOPTERS: 46.25
OD_K1POWER_DIOPTERS: 45.75
OS_AXISANGLE2_DEGREES: 93
OD_AXISANGLE_DEGREES: 159

## 2023-01-13 ASSESSMENT — VISUAL ACUITY
OD_SC: 20/20
OS_PH: 20/20
OS_SC: 20/30